# Patient Record
(demographics unavailable — no encounter records)

---

## 2024-11-05 NOTE — REASON FOR VISIT
[Home] : at home, [unfilled] , at the time of the visit. [Medical Office: ([facility])___] : at the medical office located in  [Patient] : the patient [This encounter was initiated by telehealth (audio with video) and converted to telephone (audio only) due to technical difficulties.] : This encounter was initiated by telehealth (audio with video) and converted to telephone (audio only) due to technical difficulties. [Follow-Up Visit] : a follow-up

## 2024-11-05 NOTE — HISTORY OF PRESENT ILLNESS
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  Date of Initial Diagnosis: 8/18/2021  [facility] 5/2024: TMB 5mts/mb. BARD1 V767f *4 (46%). HRAS Q61R (29.1%). PTCH1 splice variant. ASXL1 Q695*. ACVR1B R420*. CASP8 R452*, TERT promoter mutation. TGFBR2 S441F. VUS in BRCA2, BAP1, MYCN, ROS1, CIC, TGFBR2, KMT2D.  Current Treatment: Capecitabine  Previous Treatments: Vismodegib 150 mg daily 10/2021 - 2/2023 Vismodegib 150 mg daily, 2 weeks on 2 weeks off: 2/2023 - 5/2023 Cemiplimab 350 mg IV q3 weeks x 6 cycles: 5/16/23 - 8/29/23 Carboplatin AUC 4/paclitaxel 135 mg/m2 x 3 cycles: 9/19/23 - 10/31/23 Cetuximab 500 mg/m2 q2 weeks + nivolumab 240mg started at C2:11/28/23 - 06/2024 Vismodegib + nivolumab 06/2024-7/2024 Ipilimumab + nivolumab 07/11/2024 - present; C1 07/11/24 C2 08/01/24 Nivolumab 480 mg IV and vismodegib 150 mg daily: 06/2024 -    INTERVAL HISTORY:  Mr. Cook is a 67-year-old male with BCC and SCC of the face who presents for continued management of his locally advanced disease.  He is followed primarily at [facility] and sees Dr. Cata Bee, Dr. Abhinav Baldwin and Dr. Deni Garcia.  Patient reported that around 30 years ago he had BCC of the face resected by wide excision on scalp/forehead. In 2008, he noticed that the lesion had been growing but was experiencing financial difficulties caused by the recession and was unable to seek medical attention. In 2017, it begun to affect his vision and he attempted to use topical treatments. Eventually the erosive face lesion was biopsied 8/18/21 demonstrating BCC, superficial, nodular, and infiltrative types, + ulceration, extending to peripheral and deep margins. Pathologic review [facility] also noted focal metatypical features and neighboring proliferating actinic keratosis.  10/29/2021: He started vismodegib 150 mg daily and tolerated relatively well, with change in taste and intermittent muscle cramps ultimately requiring dose reduction. He achieved a marked clinical response to therapy prior to progression noted clinically as enlarging lesion on R cheek in May 2023.  5/16/2023: He began cemiplimab 350mg q3 weeks. When he presented for C2, he had significant pus drainage from opening above his R eye and throughout the wound. He was prescribed antibiotics by Dr. Baldwin, initially improved but required a longer course and ultimately prompted CT maxillofacial 8/11/23. The CT showed progression in his disease with a new large right frontal soft tissue mass, with erosive osseous changes and invasion of the R frontal sinus. Extensive additional regions of pre-existing ulcerate neoplasm are stable to minimally improved. He received a total of 6 cycles cemiplimab.  8/29/23: Dr. Baldwin biopsied multiple growing lesions including nasal septum, right nose, and right cheek. Pathology of all three regions showed SCC, well-differentiated, by cytokeratin staining.  09/15/23: He underwent baseline PET CT which demonstrated three separate hypermetabolic lesions involving the skin of the face including left frontal, midline nasal and right frontotemporal areas representing active malignancy, and bilateral hypermetabolic cervical lymph nodes. There was no evidence for distant hypermetabolic disease.  9/19/2023: He initiated dose reduced carbo/taxol and completed 2 cycles, tolerated well.   11/14/2023: Restaging noncontrast CT showed mixed response with progression in anterior nasal cavity and ethmoid sinuses with bone loss, multifocal lobulated thickening along the nose is worse compared to prior. Area of right superolateral orbital thickening extending towards the dura and along the right superior eyelid is also worse. Bilateral proptosis is stable. Chronic rhinosinusitis. Post-treatment changes in midline face and right orbit. There is no cervical lymphadenopathy or evidence of distant metastases. Left front region is not imaged on this scan.   11/28/23: He began cetuximab 500 mg/m2 and During C1 infusion, he had reaction with flushing / full body itching within about 5 min of infusion start. He was given famotidine 20 mg and methylpred 125 mg (in addition to the diphenhydramine 50 mg pre med) and was subsequently able to tolerate infusion at slower rate without issue.  1/11/24:  MR Face & Neck with and without contrast demonstrated "favorable treatment response with improved but persistent disease involving the right orbit, right aspect of the face with some minimal intracranial extension but without intra-axial extension. Small amount of nodular thickening is also noted in the left supraorbital scalp. A very small loculated fluid collection within right supraorbital portion of the tumor, most likely area of necrosis; no acute infarction, acute intracranial hemorrhage, or mass effect within the brain; stable 5 mm enhancing extra-axial lesion within the left aspect of the foramen magnum, likely incidental meningioma."  5/30/2024: Patient presented at [place] for cetuximab + nivo. He reported that the open area on the right side of his forehead was experiencing intermittent bloody discharge. Primary SE from treatment were fatigue, joint aches (hands/knees), acneiform rash on his neck/cheeeks (persistent but mild) - using topical clindamycin.   Due to progressive disease, his therapy was changed to vismodegib together with nivolumab which he has been tolerating well.  Patient is here today for a follow up visit, last seen mid - June.  Patient is s/p imaging at [place].   PATHOLOGY - Right Cheek Biopsy (4/17/2024): Squamous cell carcinoma, keratinizing - type, well - differentiated. Carcinoma is on the deep and radial margins.   IMAGING - MR Face (4/18/2024): Lytic destructive changes in the frontal bone and sinuses; interval increase in size of enhancing soft tissue mass that measures approximately 1.2 x 1.9 x 2.9 cm in the frontal region as well as in the right greater than left nasal labial fold likely to represent granulation tissue and/or disease progression. Small defect along the left nasal ala and anterior nasal septum. Polpoid mucosal thickening left maxillary and bilateral ethmoid air cells. Reactive pachymeningeal thickening in the left frontal region subjacent to the lytic bone lesions probably reactive. No epidural fluid collection. Thickening and enhancement along the posterior wall of the right globe.  - MR Brain (4/18/2024): No acute infarct, intracranial hemorrhage or abnormal parenchymal enhancement. Stable 5 mm enhancing round lesions in the left aspect of the foramen magnum behind the V4 segment of the vertebral artery.  - PET-CT (716/024): Interval progression of local extension of multiple now confluent hypermetabolic facial masses, consistent with primary neoplasm with associated erosion of the bilateral frontal bones medially. New evidence of FDG-avid pachymeningeal and possible leptomeningeal involvement. Local invasion of the right medial orbital wall with possible involvement of the ethmoid sinus and orbital septum and preseptal space penetration suspected. Interval progression of FDG-avid cervical lymphadenopathy. Slight interval increase in FDG uptake of bilateral axillary LNs.   Since he was last seen here, he initiated therapy on capecitabine due to progression of disease.  After 1 week of therapy, he states he noted an improvement in the appearance of his disease.  Given a phase I clinical trial opportunity, he discontinue the capecitabine while he considered trial participation; however, due to the trial logistics and travel complexity, he has opted not to pursue this study.    Clinically, he noted some toxicity due to capecitabine and has had difficulties with hyperglycemica in the setting of prednisone.

## 2024-11-05 NOTE — ASSESSMENT
[FreeTextEntry1] : Mr. Cook is a 67 year old male who is here today for a second opinion.  He has a history of BCC reseceted remotely, followed by recurrence in August 2021 of BCC with metatypical features involving the forehead, nose and cheek. He initially achieved good control on vismodegib until May 2023 when he developed disease progression with right eye vision loss and progressing disease to the contralateral face. He then was initiated on cemiplimab x 6 cycles and was found to have a new 4 cm frontal soft tissue mass which was invading his frontal sinus bone and orbit. Biopsies of these lesions were consistent with SCC. Patient then was transitioned to carbo/taxol and then was transitioned to cetuximab + nivo with an initial good clinical response.  In the setting of progression, his therapy was changed to vismodegib and nivoluamb and, most recently, capecitabine.  I will speak with Jaya Bee and her team regarding options which include reinitiation of capecitabine, perhaps at a dose reduction.  I will tentatively plan to touch base with the patient in 2 weeks time.

## 2024-11-12 NOTE — REVIEW OF SYSTEMS
[Diarrhea: Grade 0] : Diarrhea: Grade 0 [Negative] : Allergic/Immunologic [FreeTextEntry9] : Bilateral lower extremity swelling

## 2024-11-12 NOTE — HISTORY OF PRESENT ILLNESS
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  MOLECULAR CHARACTERISTICS: [facility] 5/2024: TMB 5mts/mb. BARD1 V767f *4 (46%). HRAS Q61R (29.1%). PTCH1 splice variant. ASXL1 Q695*. ACVR1B R420*. CASP8 R452*, TERT promoter mutation. TGFBR2 S441F. VUS in BRCA2, BAP1, MYCN, ROS1, CIC, TGFBR2, KMT2D.  CURRENT THERAPY: Capecitabine  PRIOR THERAPIES: Vismodegib 150 mg daily 10/2021 - 2/2023 Vismodegib 150 mg daily, 2 weeks on 2 weeks off: 2/2023 - 5/2023 Cemiplimab 350 mg IV q3 weeks x 6 cycles: 5/16/23 - 8/29/23 Carboplatin AUC 4/paclitaxel 135 mg/m2 x 3 cycles: 9/19/23 - 10/31/23 Cetuximab 500 mg/m2 q2 weeks + nivolumab 240mg started at C2:11/28/23 - 06/2024 Vismodegib + nivolumab 06/2024-7/2024 Ipilimumab + nivolumab 07/11/2024 - present; C1 07/11/24 C2 08/01/24 Nivolumab 480 mg IV and vismodegib 150 mg daily: 06/2024 -   INTERVAL HISTORY: Mr. Cook is a 67-year-old male with BCC and SCC of the face who presents for continued management of his locally advanced disease.  He is followed primarily at [facility] and sees Dr. Cata Bee, Dr. Abhinav Baldwin and Dr. Deni Garcia.  Please see my prior notes for his complex history.  Briefly, the patient reported that around 30 years ago he had BCC of the face resected by wide excision on scalp/forehead. In 2008, he noticed that the lesion had been growing but was experiencing financial difficulties caused by the recession and was unable to seek medical attention. In 2017, it begun to affect his vision and he attempted to use topical treatments. Eventually the erosive face lesion was biopsied 8/18/21 demonstrating BCC, superficial, nodular, and infiltrative types, + ulceration, extending to peripheral and deep margins. Pathologic review at [facility] also noted focal metatypical features and neighboring proliferating actinic keratosis.  He initiated therapy with vismodegib 150 mg daily on 10/29/2021and tolerated relatively well, with change in taste and intermittent muscle cramps ultimately requiring dose reduction. He achieved a marked clinical response to therapy prior to progression noted clinically as enlarging lesion on R cheek in May 2023.  5/16/2023: He began cemiplimab 350mg q3 weeks. When he presented for C2, he had significant pus drainage from opening above his R eye and throughout the wound. He was prescribed antibiotics by Dr. Baldwin, initially improved but required a longer course and ultimately prompted CT maxillofacial 8/11/23. The CT showed progression in his disease with a new large right frontal soft tissue mass, with erosive osseous changes and invasion of the R frontal sinus. Extensive additional regions of pre-existing ulcerate neoplasm are stable to minimally improved. He received a total of 6 cycles cemiplimab.  8/29/23: Dr. Baldwin biopsied multiple growing lesions including nasal septum, right nose, and right cheek. Pathology of all three regions showed SCC, well-differentiated, by cytokeratin staining.  09/15/23: He underwent baseline PET CT which demonstrated three separate hypermetabolic lesions involving the skin of the face including left frontal, midline nasal and right frontotemporal areas representing active malignancy, and bilateral hypermetabolic cervical lymph nodes. There was no evidence for distant hypermetabolic disease.  9/19/2023: He initiated dose reduced carbo/taxol and completed 2 cycles, tolerated well.   11/14/2023: Restaging noncontrast CT showed mixed response with progression in anterior nasal cavity and ethmoid sinuses with bone loss, multifocal lobulated thickening along the nose is worse compared to prior. Area of right superolateral orbital thickening extending towards the dura and along the right superior eyelid is also worse. Bilateral proptosis is stable. Chronic rhinosinusitis. Post-treatment changes in midline face and right orbit. There is no cervical lymphadenopathy or evidence of distant metastases. Left front region is not imaged on this scan.   11/28/23: He began cetuximab 500 mg/m2 and During C1 infusion, he had reaction with flushing / full body itching within about 5 min of infusion start. He was given famotidine 20 mg and methylpred 125 mg (in addition to the diphenhydramine 50 mg pre med) and was subsequently able to tolerate infusion at slower rate without issue.  1/11/24:  MR Face & Neck with and without contrast demonstrated "favorable treatment response with improved but persistent disease involving the right orbit, right aspect of the face with some minimal intracranial extension but without intra-axial extension. Small amount of nodular thickening is also noted in the left supraorbital scalp. A very small loculated fluid collection within right supraorbital portion of the tumor, most likely area of necrosis; no acute infarction, acute intracranial hemorrhage, or mass effect within the brain; stable 5 mm enhancing extra-axial lesion within the left aspect of the foramen magnum, likely incidental meningioma."  5/30/2024: Patient presented at [place] for cetuximab + nivo. He reported that the open area on the right side of his forehead was experiencing intermittent bloody discharge. Primary SE from treatment were fatigue, joint aches (hands/knees), acneiform rash on his neck/cheeeks (persistent but mild) - using topical clindamycin.   Due to progressive disease, his therapy was changed to vismodegib together with nivolumab which he has been tolerating well.  Patient is here today for a follow up visit, last seen mid - June.  Patient is s/p imaging at [place].   PATHOLOGY - Right Cheek Biopsy (4/17/2024): Squamous cell carcinoma, keratinizing - type, well - differentiated. Carcinoma is on the deep and radial margins.   IMAGING - MR Face (4/18/2024): Lytic destructive changes in the frontal bone and sinuses; interval increase in size of enhancing soft tissue mass that measures approximately 1.2 x 1.9 x 2.9 cm in the frontal region as well as in the right greater than left nasal labial fold likely to represent granulation tissue and/or disease progression. Small defect along the left nasal ala and anterior nasal septum. Polpoid mucosal thickening left maxillary and bilateral ethmoid air cells. Reactive pachymeningeal thickening in the left frontal region subjacent to the lytic bone lesions probably reactive. No epidural fluid collection. Thickening and enhancement along the posterior wall of the right globe.  - MR Brain (4/18/2024): No acute infarct, intracranial hemorrhage or abnormal parenchymal enhancement. Stable 5 mm enhancing round lesions in the left aspect of the foramen magnum behind the V4 segment of the vertebral artery.   - PET-CT (716/024): Interval progression of local extension of multiple now confluent hypermetabolic facial masses, consistent with primary neoplasm with associated erosion of the bilateral frontal bones medially. New evidence of FDG-avid pachymeningeal and possible leptomeningeal involvement. Local invasion of the right medial orbital wall with possible involvement of the ethmoid sinus and orbital septum and preseptal space penetration suspected. Interval progression of FDG-avid cervical lymphadenopathy. Slight interval increase in FDG uptake of bilateral axillary LNs.  Due to the development of colitis, the immunotherapy was discontinued.  He was started on a steroid taper. He then initiated therapy on capecitabine due to progression of disease.  After 1 week of therapy, he states he noted an improvement in the appearance of his disease.  Given a phase I clinical trial opportunity, he discontinue the capecitabine while he considered trial participation; however, due to the trial logistics and travel complexity, he has opted not to pursue this study.  He was started on metformin for the steroid induced hyperglycemia.  He was more recently restarted on capecitabine at a reduced dose, with good tolerance and a clinical response to therapy per the patient's report.

## 2024-11-12 NOTE — REASON FOR VISIT
[Home] : at home, [unfilled] , at the time of the visit. [Medical Office: ([facility])___] : at the medical office located in  [Patient] : the patient [Self] : self [This encounter was initiated by telehealth (audio with video) and converted to telephone (audio only) due to technical difficulties.] : This encounter was initiated by telehealth (audio with video) and converted to telephone (audio only) due to technical difficulties. [Follow-Up Visit] : a follow-up

## 2024-11-12 NOTE — ASSESSMENT
[FreeTextEntry1] : Mr. Cook is a 67 year old male who is here today for a discussion of further management options for his disease  He has a history of BCC reseceted remotely, followed by recurrence in August 2021 of BCC with metatypical features involving the forehead, nose and cheek. He initially achieved good control on vismodegib until May 2023 when he developed disease progression with right eye vision loss and progressing disease to the contralateral face. He then was initiated on cemiplimab x 6 cycles and was found to have a new 4 cm frontal soft tissue mass which was invading his frontal sinus bone and orbit. Biopsies of these lesions were consistent with SCC. Patient then was transitioned to carbo/taxol and then was transitioned to cetuximab + nivo with an initial good clinical response.  In the setting of progression, his therapy was changed to vismodegib and nivoluamb and, most recently, capecitabine.  Due to immune mediated colitis, he has been on a prednisone taper and is scheduled to receive infliximab later on this week.  Based upon history, the patient may be achieving a clinical response to capecitabine.  I did discuss potential investigational options, including our phase I trial of  (EGFR).  I reviewed the study rational, treatment logistics, and potential toxicities.  I discussed that eligibility requires evidence of disease progression as well as demonstration of EGFR expression on his tumor, with testing on going at [place].  After a long discussion, all of his questions and concerns were answered to his satisfaction. At this time, he will continue capecitabine under the care of Dr. Bee and her team; however, we will continue screening for the JANICE-1 trial as a potential option in the setting of disease progression.  He will remain under the primary care of the [place] team but will contact us should he need a next line of therapy. English

## 2024-12-09 NOTE — REASON FOR VISIT
[Follow-Up Visit] : a follow-up [Home] : at home, [unfilled] , at the time of the visit. [Medical Office: ([facility])___] : at the medical office located in  [Patient] : the patient [Self] : self [This encounter was initiated by telehealth (audio with video) and converted to telephone (audio only) due to technical difficulties.] : This encounter was initiated by telehealth (audio with video) and converted to telephone (audio only) due to technical difficulties.

## 2024-12-09 NOTE — REASON FOR VISIT
[Follow-Up Visit] : a follow-up [Home] : at home, [unfilled] , at the time of the visit. [Medical Office: ([facility])___] : at the medical office located in  [Patient] : the patient [Self] : self [This encounter was initiated by telehealth (audio with video) and converted to telephone (audio only) due to technical difficulties.] : This encounter was initiated by telehealth (audio with video) and converted to telephone (audio only) due to technical difficulties.

## 2024-12-10 NOTE — HISTORY OF PRESENT ILLNESS
[de-identified] : Patient is here today for a follow up visit, he was last seen on 11/12/2024.  Since his last visit, he was admitted in [facility] for bleeding in his small intestine, but was ultimately transferred to [place].  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  MOLECULAR CHARACTERISTICS: Foundation one 5/2024: TMB 5mts/mb. BARD1 V767f *4 (46%). HRAS Q61R (29.1%). PTCH1 splice variant. ASXL1 Q695*. ACVR1B R420*. CASP8 R452*, TERT promoter mutation. TGFBR2 S441F. VUS in BRCA2, BAP1, MYCN, ROS1, CIC, TGFBR2, KMT2D.  CURRENT THERAPY: Capecitabine (currently being held)  PRIOR THERAPIES: Vismodegib 150 mg daily 10/2021 - 2/2023 Vismodegib 150 mg daily, 2 weeks on 2 weeks off: 2/2023 - 5/2023 Cemiplimab 350 mg IV q3 weeks x 6 cycles: 5/16/23 - 8/29/23 Carboplatin AUC 4/paclitaxel 135 mg/m2 x 3 cycles: 9/19/23 - 10/31/23 Cetuximab 500 mg/m2 q2 weeks + nivolumab 240mg started at C2:11/28/23 - 06/2024 Vismodegib + nivolumab 06/2024-7/2024 Ipilimumab + nivolumab 07/11/2024 - present; C1 07/11/24 C2 08/01/24 Nivolumab 480 mg IV and vismodegib 150 mg daily: 06/2024 -   INTERVAL HISTORY: Mr. Cook is a 67-year-old male with BCC and SCC of the face who presents for continued management of his locally advanced disease.  He is followed primarily at [facility] and sees Dr. Cata Bee, Dr. Abhinav Baldwin and Dr. Deni Garcia.  Please see my prior notes for his complex history.  Briefly, the patient reported that around 30 years ago he had BCC of the face resected by wide excision on scalp/forehead. In 2008, he noticed that the lesion had been growing but was experiencing financial difficulties caused by the recession and was unable to seek medical attention. In 2017, it begun to affect his vision and he attempted to use topical treatments. Eventually the erosive face lesion was biopsied 8/18/21 demonstrating BCC, superficial, nodular, and infiltrative types, + ulceration, extending to peripheral and deep margins. Pathologic review at [facility] also noted focal metatypical features and neighboring proliferating actinic keratosis.  He initiated therapy with vismodegib 150 mg daily on 10/29/2021and tolerated relatively well, with change in taste and intermittent muscle cramps ultimately requiring dose reduction. He achieved a marked clinical response to therapy prior to progression noted clinically as enlarging lesion on R cheek in May 2023.  5/16/2023: He began cemiplimab 350mg q3 weeks. When he presented for C2, he had significant pus drainage from opening above his R eye and throughout the wound. He was prescribed antibiotics by Dr. Baldwin, initially improved but required a longer course and ultimately prompted CT maxillofacial 8/11/23. The CT showed progression in his disease with a new large right frontal soft tissue mass, with erosive osseous changes and invasion of the R frontal sinus. Extensive additional regions of pre-existing ulcerate neoplasm are stable to minimally improved. He received a total of 6 cycles cemiplimab.  8/29/23: Dr. Baldwin biopsied multiple growing lesions including nasal septum, right nose, and right cheek. Pathology of all three regions showed SCC, well-differentiated, by cytokeratin staining.  09/15/23: He underwent baseline PET CT which demonstrated three separate hypermetabolic lesions involving the skin of the face including left frontal, midline nasal and right frontotemporal areas representing active malignancy, and bilateral hypermetabolic cervical lymph nodes. There was no evidence for distant hypermetabolic disease.  9/19/2023: He initiated dose reduced carbo/taxol and completed 2 cycles, tolerated well.   11/14/2023: Restaging noncontrast CT showed mixed response with progression in anterior nasal cavity and ethmoid sinuses with bone loss, multifocal lobulated thickening along the nose is worse compared to prior. Area of right superolateral orbital thickening extending towards the dura and along the right superior eyelid is also worse. Bilateral proptosis is stable. Chronic rhinosinusitis. Post-treatment changes in midline face and right orbit. There is no cervical lymphadenopathy or evidence of distant metastases. Left front region is not imaged on this scan.   11/28/23: He began cetuximab 500 mg/m2 and During C1 infusion, he had reaction with flushing / full body itching within about 5 min of infusion start. He was given famotidine 20 mg and methylpred 125 mg (in addition to the diphenhydramine 50 mg pre med) and was subsequently able to tolerate infusion at slower rate without issue.  1/11/24:  MR Face & Neck with and without contrast demonstrated "favorable treatment response with improved but persistent disease involving the right orbit, right aspect of the face with some minimal intracranial extension but without intra-axial extension. Small amount of nodular thickening is also noted in the left supraorbital scalp. A very small loculated fluid collection within right supraorbital portion of the tumor, most likely area of necrosis; no acute infarction, acute intracranial hemorrhage, or mass effect within the brain; stable 5 mm enhancing extra-axial lesion within the left aspect of the foramen magnum, likely incidental meningioma."  5/30/2024: Patient presented at [facility] for cetuximab + nivo. He reported that the open area on the right side of his forehead was experiencing intermittent bloody discharge. Primary SE from treatment were fatigue, joint aches (hands/knees), acneiform rash on his neck/cheeeks (persistent but mild) - using topical clindamycin.   Due to progressive disease, his therapy was changed to vismodegib together with nivolumab which he has been tolerating well.  Patient is here today for a follow up visit, last seen mid - June.  Patient is s/p imaging at [facility].   PATHOLOGY - Right Cheek Biopsy (4/17/2024): Squamous cell carcinoma, keratinizing - type, well - differentiated. Carcinoma is on the deep and radial margins.   IMAGING - MR Face (4/18/2024): Lytic destructive changes in the frontal bone and sinuses; interval increase in size of enhancing soft tissue mass that measures approximately 1.2 x 1.9 x 2.9 cm in the frontal region as well as in the right greater than left nasal labial fold likely to represent granulation tissue and/or disease progression. Small defect along the left nasal ala and anterior nasal septum. Polpoid mucosal thickening left maxillary and bilateral ethmoid air cells. Reactive pachymeningeal thickening in the left frontal region subjacent to the lytic bone lesions probably reactive. No epidural fluid collection. Thickening and enhancement along the posterior wall of the right globe.  - MR Brain (4/18/2024): No acute infarct, intracranial hemorrhage or abnormal parenchymal enhancement. Stable 5 mm enhancing round lesions in the left aspect of the foramen magnum behind the V4 segment of the vertebral artery.   - PET-CT (716/024): Interval progression of local extension of multiple now confluent hypermetabolic facial masses, consistent with primary neoplasm with associated erosion of the bilateral frontal bones medially. New evidence of FDG-avid pachymeningeal and possible leptomeningeal involvement. Local invasion of the right medial orbital wall with possible involvement of the ethmoid sinus and orbital septum and preseptal space penetration suspected. Interval progression of FDG-avid cervical lymphadenopathy. Slight interval increase in FDG uptake of bilateral axillary LNs.  Due to the development of colitis, the immunotherapy was discontinued.  He was started on a steroid taper. He then initiated therapy on capecitabine due to progression of disease.  After 1 week of therapy, he states he noted an improvement in the appearance of his disease.  Given a phase I clinical trial opportunity, he discontinued the capecitabine while he considered trial participation; however, due to the trial logistics and travel complexity, he has opted not to pursue this study.  He was started on metformin for the steroid induced hyperglycemia.  He was more recently restarted on capecitabine at a reduced dose, with good tolerance and a clinical response to therapy per the patient's report.  Since his last visit, he was admitted in [facility] for bleeding in his small intestine but was ultimately transferred to [facility]. He states that his Hgb remains around 7 g/dL and they will be doing an additional study to determine where the bleed is occurring. Patient reports that he would like to be reconsidered for the trial once his bleeding has been controlled.

## 2024-12-10 NOTE — REVIEW OF SYSTEMS
[Diarrhea: Grade 0] : Diarrhea: Grade 0 [Negative] : Allergic/Immunologic [FreeTextEntry9] : Bilateral lower extremity swelling [FreeTextEntry7] : Currently experiencing a GI bleed.

## 2024-12-10 NOTE — ASSESSMENT
[FreeTextEntry1] : Mr. Cook is a 67-year-old male who is here today for a discussion of further management options for his disease  He has a history of BCC resected remotely, followed by recurrence in August 2021 of BCC with metatypical features involving the forehead, nose and cheek. He initially achieved good control on vismodegib until May 2023 when he developed disease progression with right eye vision loss and progressing disease to the contralateral face. He then was initiated on cemiplimab x 6 cycles and was found to have a new 4 cm frontal soft tissue mass which was invading his frontal sinus bone and orbit. Biopsies of these lesions were consistent with SCC. Patient then was transitioned to carbo/taxol and then was transitioned to cetuximab + nivo with an initial good clinical response.  In the setting of progression, his therapy was changed to vismodegib and nivoluamb and, most recently, capecitabine.  Due to immune mediated colitis, he had been on a prednisone taper and is scheduled to received infliximab.  Based upon history, the patient may have been achieving a clinical response to capecitabine.  During our previous visits, I did discuss potential investigational options, including our phase I trial of  (EGFR).  I reviewed the study rational, treatment logistics, and potential toxicities.  I discussed that eligibility requires evidence of disease progression as well as demonstration of EGFR expression on his tumor. EGFR staining revealed +1 expression of ERBB1/EFGR.   At this time, we are recommending that patient control his bleeding. Once he is stable, we will continue screening for the JANICE-1 trial as a potential option in the setting of disease progression. In addition, patient also reports that he would like to transfer his care to [facility]. We will see patient in person next Tuesday for an in person visit.   Patient's son, Luciano, was present during this call, he provided his # as well, it is [phone].

## 2024-12-10 NOTE — ASSESSMENT
[FreeTextEntry1] : Mr. Cook is a 67-year-old male who is here today for a discussion of further management options for his disease  He has a history of BCC resected remotely, followed by recurrence in August 2021 of BCC with metatypical features involving the forehead, nose and cheek. He initially achieved good control on vismodegib until May 2023 when he developed disease progression with right eye vision loss and progressing disease to the contralateral face. He then was initiated on cemiplimab x 6 cycles and was found to have a new 4 cm frontal soft tissue mass which was invading his frontal sinus bone and orbit. Biopsies of these lesions were consistent with SCC. Patient then was transitioned to carbo/taxol and then was transitioned to cetuximab + nivo with an initial good clinical response.  In the setting of progression, his therapy was changed to vismodegib and nivoluamb and, most recently, capecitabine.  Due to immune mediated colitis, he had been on a prednisone taper and is scheduled to received infliximab.  Based upon history, the patient may have been achieving a clinical response to capecitabine.  During our previous visits, I did discuss potential investigational options, including our phase I trial of  (EGFR).  I reviewed the study rational, treatment logistics, and potential toxicities.  I discussed that eligibility requires evidence of disease progression as well as demonstration of EGFR expression on his tumor. EGFR staining revealed +1 expression of ERBB1/EFGR.   At this time, we are recommending that patient control his bleeding. Once he is stable, we will continue screening for the JANICE-1 trial as a potential option in the setting of disease progression. In addition, patient also reports that he would like to transfer his care to [facility]. We will see patient in person next Tuesday for an in person visit.   Patient's son, Luciano, was present during this call, he provided his # as well, it is [phone].

## 2024-12-10 NOTE — HISTORY OF PRESENT ILLNESS
[de-identified] : Patient is here today for a follow up visit, he was last seen on 11/12/2024.  Since his last visit, he was admitted in [facility] for bleeding in his small intestine, but was ultimately transferred to [place].  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  MOLECULAR CHARACTERISTICS: Foundation one 5/2024: TMB 5mts/mb. BARD1 V767f *4 (46%). HRAS Q61R (29.1%). PTCH1 splice variant. ASXL1 Q695*. ACVR1B R420*. CASP8 R452*, TERT promoter mutation. TGFBR2 S441F. VUS in BRCA2, BAP1, MYCN, ROS1, CIC, TGFBR2, KMT2D.  CURRENT THERAPY: Capecitabine (currently being held)  PRIOR THERAPIES: Vismodegib 150 mg daily 10/2021 - 2/2023 Vismodegib 150 mg daily, 2 weeks on 2 weeks off: 2/2023 - 5/2023 Cemiplimab 350 mg IV q3 weeks x 6 cycles: 5/16/23 - 8/29/23 Carboplatin AUC 4/paclitaxel 135 mg/m2 x 3 cycles: 9/19/23 - 10/31/23 Cetuximab 500 mg/m2 q2 weeks + nivolumab 240mg started at C2:11/28/23 - 06/2024 Vismodegib + nivolumab 06/2024-7/2024 Ipilimumab + nivolumab 07/11/2024 - present; C1 07/11/24 C2 08/01/24 Nivolumab 480 mg IV and vismodegib 150 mg daily: 06/2024 -   INTERVAL HISTORY: Mr. Cook is a 67-year-old male with BCC and SCC of the face who presents for continued management of his locally advanced disease.  He is followed primarily at [facility] and sees Dr. Cata Bee, Dr. Abhinav Baldwin and Dr. Deni Garcia.  Please see my prior notes for his complex history.  Briefly, the patient reported that around 30 years ago he had BCC of the face resected by wide excision on scalp/forehead. In 2008, he noticed that the lesion had been growing but was experiencing financial difficulties caused by the recession and was unable to seek medical attention. In 2017, it begun to affect his vision and he attempted to use topical treatments. Eventually the erosive face lesion was biopsied 8/18/21 demonstrating BCC, superficial, nodular, and infiltrative types, + ulceration, extending to peripheral and deep margins. Pathologic review at [facility] also noted focal metatypical features and neighboring proliferating actinic keratosis.  He initiated therapy with vismodegib 150 mg daily on 10/29/2021and tolerated relatively well, with change in taste and intermittent muscle cramps ultimately requiring dose reduction. He achieved a marked clinical response to therapy prior to progression noted clinically as enlarging lesion on R cheek in May 2023.  5/16/2023: He began cemiplimab 350mg q3 weeks. When he presented for C2, he had significant pus drainage from opening above his R eye and throughout the wound. He was prescribed antibiotics by Dr. Baldwin, initially improved but required a longer course and ultimately prompted CT maxillofacial 8/11/23. The CT showed progression in his disease with a new large right frontal soft tissue mass, with erosive osseous changes and invasion of the R frontal sinus. Extensive additional regions of pre-existing ulcerate neoplasm are stable to minimally improved. He received a total of 6 cycles cemiplimab.  8/29/23: Dr. Baldwin biopsied multiple growing lesions including nasal septum, right nose, and right cheek. Pathology of all three regions showed SCC, well-differentiated, by cytokeratin staining.  09/15/23: He underwent baseline PET CT which demonstrated three separate hypermetabolic lesions involving the skin of the face including left frontal, midline nasal and right frontotemporal areas representing active malignancy, and bilateral hypermetabolic cervical lymph nodes. There was no evidence for distant hypermetabolic disease.  9/19/2023: He initiated dose reduced carbo/taxol and completed 2 cycles, tolerated well.   11/14/2023: Restaging noncontrast CT showed mixed response with progression in anterior nasal cavity and ethmoid sinuses with bone loss, multifocal lobulated thickening along the nose is worse compared to prior. Area of right superolateral orbital thickening extending towards the dura and along the right superior eyelid is also worse. Bilateral proptosis is stable. Chronic rhinosinusitis. Post-treatment changes in midline face and right orbit. There is no cervical lymphadenopathy or evidence of distant metastases. Left front region is not imaged on this scan.   11/28/23: He began cetuximab 500 mg/m2 and During C1 infusion, he had reaction with flushing / full body itching within about 5 min of infusion start. He was given famotidine 20 mg and methylpred 125 mg (in addition to the diphenhydramine 50 mg pre med) and was subsequently able to tolerate infusion at slower rate without issue.  1/11/24:  MR Face & Neck with and without contrast demonstrated "favorable treatment response with improved but persistent disease involving the right orbit, right aspect of the face with some minimal intracranial extension but without intra-axial extension. Small amount of nodular thickening is also noted in the left supraorbital scalp. A very small loculated fluid collection within right supraorbital portion of the tumor, most likely area of necrosis; no acute infarction, acute intracranial hemorrhage, or mass effect within the brain; stable 5 mm enhancing extra-axial lesion within the left aspect of the foramen magnum, likely incidental meningioma."  5/30/2024: Patient presented at [facility] for cetuximab + nivo. He reported that the open area on the right side of his forehead was experiencing intermittent bloody discharge. Primary SE from treatment were fatigue, joint aches (hands/knees), acneiform rash on his neck/cheeeks (persistent but mild) - using topical clindamycin.   Due to progressive disease, his therapy was changed to vismodegib together with nivolumab which he has been tolerating well.  Patient is here today for a follow up visit, last seen mid - June.  Patient is s/p imaging at [facility].   PATHOLOGY - Right Cheek Biopsy (4/17/2024): Squamous cell carcinoma, keratinizing - type, well - differentiated. Carcinoma is on the deep and radial margins.   IMAGING - MR Face (4/18/2024): Lytic destructive changes in the frontal bone and sinuses; interval increase in size of enhancing soft tissue mass that measures approximately 1.2 x 1.9 x 2.9 cm in the frontal region as well as in the right greater than left nasal labial fold likely to represent granulation tissue and/or disease progression. Small defect along the left nasal ala and anterior nasal septum. Polpoid mucosal thickening left maxillary and bilateral ethmoid air cells. Reactive pachymeningeal thickening in the left frontal region subjacent to the lytic bone lesions probably reactive. No epidural fluid collection. Thickening and enhancement along the posterior wall of the right globe.  - MR Brain (4/18/2024): No acute infarct, intracranial hemorrhage or abnormal parenchymal enhancement. Stable 5 mm enhancing round lesions in the left aspect of the foramen magnum behind the V4 segment of the vertebral artery.   - PET-CT (716/024): Interval progression of local extension of multiple now confluent hypermetabolic facial masses, consistent with primary neoplasm with associated erosion of the bilateral frontal bones medially. New evidence of FDG-avid pachymeningeal and possible leptomeningeal involvement. Local invasion of the right medial orbital wall with possible involvement of the ethmoid sinus and orbital septum and preseptal space penetration suspected. Interval progression of FDG-avid cervical lymphadenopathy. Slight interval increase in FDG uptake of bilateral axillary LNs.  Due to the development of colitis, the immunotherapy was discontinued.  He was started on a steroid taper. He then initiated therapy on capecitabine due to progression of disease.  After 1 week of therapy, he states he noted an improvement in the appearance of his disease.  Given a phase I clinical trial opportunity, he discontinued the capecitabine while he considered trial participation; however, due to the trial logistics and travel complexity, he has opted not to pursue this study.  He was started on metformin for the steroid induced hyperglycemia.  He was more recently restarted on capecitabine at a reduced dose, with good tolerance and a clinical response to therapy per the patient's report.  Since his last visit, he was admitted in [facility] for bleeding in his small intestine but was ultimately transferred to [facility]. He states that his Hgb remains around 7 g/dL and they will be doing an additional study to determine where the bleed is occurring. Patient reports that he would like to be reconsidered for the trial once his bleeding has been controlled.

## 2024-12-18 NOTE — HISTORY OF PRESENT ILLNESS
[de-identified] : 12/17/2024 The [facility] team reported that his tumor is now impacting his left eye, patient is now scheduled to see Dr. Deni Garcia this week to address this issue.  I also reviewed a medical note from [facility] ([facility] from earlier this month. Regarding his facial lesion, patient has been initiated on doxycycline therapy. He was referred to wound care, where it was advised that he perform twice-daily saline rinses and use xeroform dressing, covering the wound with Telfa, with an optional application of clindamycin ointment to promote healing and prevent infection.  Furthermore, the patient experienced a significant gastrointestinal bleed, which led to a push enterostomy performed at [facility] on December 4, 2024. 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 During his hospital stay starting November 21, 2024, he required 12 units of packed red blood cells (pRBCs). His renal function showed notable changes, with creatinine levels rising from a baseline of 1.0 to 2.1 on the date of the visit.  12/10/2024 Since his last visit, he was admitted in [facility] for bleeding in his small intestine but was ultimately transferred to [facility]. He states that his Hgb remains around 7 g/dL and they will be doing an additional study to determine where the bleed is occurring. Patient reports that he would like to be reconsidered for the trial once his bleeding has been controlled.  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  MOLECULAR CHARACTERISTICS: Foundation one 5/2024: TMB 5mts/mb. BARD1 V767f *4 (46%). HRAS Q61R (29.1%). PTCH1 splice variant. ASXL1 Q695*. ACVR1B R420*. CASP8 R452*, TERT promoter mutation. TGFBR2 S441F. VUS in BRCA2, BAP1, MYCN, ROS1, CIC, TGFBR2, KMT2D.  CURRENT THERAPY: Capecitabine (currently being held)  PRIOR THERAPIES: Vismodegib 150 mg daily 10/2021 - 2/2023 Vismodegib 150 mg daily, 2 weeks on 2 weeks off: 2/2023 - 5/2023 Cemiplimab 350 mg IV q3 weeks x 6 cycles: 5/16/23 - 8/29/23 Carboplatin AUC 4/paclitaxel 135 mg/m2 x 3 cycles: 9/19/23 - 10/31/23 Cetuximab 500 mg/m2 q2 weeks + nivolumab 240mg started at C2:11/28/23 - 06/2024 Vismodegib + nivolumab 06/2024-7/2024 Ipilimumab + nivolumab 07/11/2024 - present; C1 07/11/24 C2 08/01/24 Nivolumab 480 mg IV and vismodegib 150 mg daily: 06/2024 -   INTERVAL HISTORY: Mr. Cook is a 67-year-old male with BCC and SCC of the face who presents for continued management of his locally advanced disease.  He is followed primarily at [facility] and sees Dr. Cata Bee, Dr. Abhinav Baldwin and Dr. Deni Garcia.  Please see my prior notes for his complex history.  Briefly, the patient reported that around 30 years ago he had BCC of the face resected by wide excision on scalp/forehead. In 2008, he noticed that the lesion had been growing but was experiencing financial difficulties caused by the recession and was unable to seek medical attention. In 2017, it begun to affect his vision and he attempted to use topical treatments. Eventually the erosive face lesion was biopsied 8/18/21 demonstrating BCC, superficial, nodular, and infiltrative types, + ulceration, extending to peripheral and deep margins. Pathologic review at [facility] also noted focal metatypical features and neighboring proliferating actinic keratosis.  He initiated therapy with vismodegib 150 mg daily on 10/29/2021and tolerated relatively well, with change in taste and intermittent muscle cramps ultimately requiring dose reduction. He achieved a marked clinical response to therapy prior to progression noted clinically as enlarging lesion on R cheek in May 2023.  5/16/2023: He began cemiplimab 350mg q3 weeks. When he presented for C2, he had significant pus drainage from opening above his R eye and throughout the wound. He was prescribed antibiotics by Dr. Baldwin, initially improved but required a longer course and ultimately prompted CT maxillofacial 8/11/23. The CT showed progression in his disease with a new large right frontal soft tissue mass, with erosive osseous changes and invasion of the R frontal sinus. Extensive additional regions of pre-existing ulcerate neoplasm are stable to minimally improved. He received a total of 6 cycles cemiplimab.  8/29/23: Dr. Baldwin biopsied multiple growing lesions including nasal septum, right nose, and right cheek. Pathology of all three regions showed SCC, well-differentiated, by cytokeratin staining.  09/15/23: He underwent baseline PET CT which demonstrated three separate hypermetabolic lesions involving the skin of the face including left frontal, midline nasal and right frontotemporal areas representing active malignancy, and bilateral hypermetabolic cervical lymph nodes. There was no evidence for distant hypermetabolic disease.  9/19/2023: He initiated dose reduced carbo/taxol and completed 2 cycles, tolerated well.   11/14/2023: Restaging noncontrast CT showed mixed response with progression in anterior nasal cavity and ethmoid sinuses with bone loss, multifocal lobulated thickening along the nose is worse compared to prior. Area of right superolateral orbital thickening extending towards the dura and along the right superior eyelid is also worse. Bilateral proptosis is stable. Chronic rhinosinusitis. Post-treatment changes in midline face and right orbit. There is no cervical lymphadenopathy or evidence of distant metastases. Left front region is not imaged on this scan.   11/28/23: He began cetuximab 500 mg/m2 and During C1 infusion, he had reaction with flushing / full body itching within about 5 min of infusion start. He was given famotidine 20 mg and methylpred 125 mg (in addition to the diphenhydramine 50 mg pre med) and was subsequently able to tolerate infusion at slower rate without issue.  1/11/24:  MR Face & Neck with and without contrast demonstrated "favorable treatment response with improved but persistent disease involving the right orbit, right aspect of the face with some minimal intracranial extension but without intra-axial extension. Small amount of nodular thickening is also noted in the left supraorbital scalp. A very small loculated fluid collection within right supraorbital portion of the tumor, most likely area of necrosis; no acute infarction, acute intracranial hemorrhage, or mass effect within the brain; stable 5 mm enhancing extra-axial lesion within the left aspect of the foramen magnum, likely incidental meningioma."  5/30/2024: Patient presented at [facility] for cetuximab + nivo. He reported that the open area on the right side of his forehead was experiencing intermittent bloody discharge. Primary SE from treatment were fatigue, joint aches (hands/knees), acneiform rash on his neck/cheeeks (persistent but mild) - using topical clindamycin.   Due to progressive disease, his therapy was changed to vismodegib together with nivolumab which he has been tolerating well.  Patient is here today for a follow up visit, last seen mid - June.  Patient is s/p imaging at [facility].   PATHOLOGY - Right Cheek Biopsy (4/17/2024): Squamous cell carcinoma, keratinizing - type, well - differentiated. Carcinoma is on the deep and radial margins.   IMAGING - MR Face (4/18/2024): Lytic destructive changes in the frontal bone and sinuses; interval increase in size of enhancing soft tissue mass that measures approximately 1.2 x 1.9 x 2.9 cm in the frontal region as well as in the right greater than left nasal labial fold likely to represent granulation tissue and/or disease progression. Small defect along the left nasal ala and anterior nasal septum. Polpoid mucosal thickening left maxillary and bilateral ethmoid air cells. Reactive pachymeningeal thickening in the left frontal region subjacent to the lytic bone lesions probably reactive. No epidural fluid collection. Thickening and enhancement along the posterior wall of the right globe.  - MR Brain (4/18/2024): No acute infarct, intracranial hemorrhage or abnormal parenchymal enhancement. Stable 5 mm enhancing round lesions in the left aspect of the foramen magnum behind the V4 segment of the vertebral artery.   - PET-CT (716/024): Interval progression of local extension of multiple now confluent hypermetabolic facial masses, consistent with primary neoplasm with associated erosion of the bilateral frontal bones medially. New evidence of FDG-avid pachymeningeal and possible leptomeningeal involvement. Local invasion of the right medial orbital wall with possible involvement of the ethmoid sinus and orbital septum and preseptal space penetration suspected. Interval progression of FDG-avid cervical lymphadenopathy. Slight interval increase in FDG uptake of bilateral axillary LNs.  Due to the development of colitis, the immunotherapy was discontinued.  He was started on a steroid taper. He then initiated therapy on capecitabine due to progression of disease.  After 1 week of therapy, he states he noted an improvement in the appearance of his disease.  Given a phase I clinical trial opportunity, he discontinued the capecitabine while he considered trial participation; however, due to the trial logistics and travel complexity, he has opted not to pursue this study.  He was started on metformin for the steroid induced hyperglycemia.  He was more recently restarted on capecitabine at a reduced dose, with good tolerance and a clinical response to therapy per the patient's report.  12/10/2024 Since his last visit, he was admitted in [facility] for bleeding in his small intestine but was ultimately transferred to [facility]. He states that his Hgb remains around 7 g/dL and they will be doing an additional study to determine where the bleed is occurring. Patient reports that he would like to be reconsidered for the trial once his bleeding has been controlled.   12/17/2024 The [facility] team reported that his tumor is now impacting his left eye, patient is now scheduled to see Dr. Deni Garcia this week to address this issue.  I also reviewed a medical note from [facility] from earlier this month. Regarding his facial lesion, patient has been initiated on doxycycline therapy. He was referred to wound care, where it was advised that he perform twice-daily saline rinses and use xeroform dressing, covering the wound with Telfa, with an optional application of clindamycin ointment to promote healing and prevent infection.  Furthermore, the patient experienced a significant gastrointestinal bleed, which led to a push enterostomy performed at [facility] on December 4, 2024. 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 During his hospital stay starting November 21, 2024, he required 12 units of packed red blood cells (pRBCs). His renal function showed notable changes, with creatinine levels rising from a baseline of 1.0 to 2.1 on the date of the visit.  Patient spoke to his [facility] team prior to this appointment. The plan is for patient to have a port inserted on Thursday and then start 5FU/Cisplatin next week, we also discussed using Imiquimod Cream to the lesion near his eye.

## 2024-12-18 NOTE — REASON FOR VISIT
[Follow-Up Visit] : a follow-up [Home] : at home, [unfilled] , at the time of the visit. [Medical Office: ([facility])___] : at the medical office located in  [Patient] : the patient [Self] : self [This encounter was initiated by telehealth (audio with video) and converted to telephone (audio only) due to technical difficulties.] : This encounter was initiated by telehealth (audio with video) and converted to telephone (audio only) due to technical difficulties. [Family Member] : family member

## 2024-12-18 NOTE — PHYSICAL EXAM
[Normal] : affect appropriate [Fully active, able to carry on all pre-disease performance without restriction] : Status 0 - Fully active, able to carry on all pre-disease performance without restriction [de-identified] : Wound on right side of his face, which encroached on his right eye and right side of his face.  [de-identified] : No increased work of breathing.

## 2024-12-18 NOTE — ASSESSMENT
[FreeTextEntry1] : Mr. Cook is a 67-year-old male who is here today for a discussion of further management options for his disease  He has a history of BCC resected remotely, followed by recurrence in August 2021 of BCC with metatypical features involving the forehead, nose and cheek. He initially achieved good control on vismodegib until May 2023 when he developed disease progression with right eye vision loss and progressing disease to the contralateral face. He then was initiated on cemiplimab x 6 cycles and was found to have a new 4 cm frontal soft tissue mass which was invading his frontal sinus bone and orbit. Biopsies of these lesions were consistent with SCC. Patient then was transitioned to carbo/taxol and then was transitioned to cetuximab + nivo with an initial good clinical response.  In the setting of progression, his therapy was changed to vismodegib and nivoluamb and, most recently, capecitabine. 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   While on capecitabine, patient thought that he was achieving a clinical response. However, it was held when patient experienced a GI bleed and required 12 units of PRBCs, endoclips and APC.   He will have a port placed on Thursday and will begin 5fu/cisplatin next week at [facility]. In addition, patient should use imiquimod cream to his wound and should also be set up with palliative care at [facility].   We will see him back in clinic within 2 months (~February 2025).

## 2024-12-18 NOTE — REVIEW OF SYSTEMS
[Diarrhea: Grade 0] : Diarrhea: Grade 0 [Negative] : Allergic/Immunologic [FreeTextEntry7] : Currently experiencing a GI bleed.

## 2024-12-18 NOTE — PHYSICAL EXAM
[Normal] : affect appropriate [Fully active, able to carry on all pre-disease performance without restriction] : Status 0 - Fully active, able to carry on all pre-disease performance without restriction [de-identified] : Wound on right side of his face, which encroached on his right eye and right side of his face.  [de-identified] : No increased work of breathing.

## 2024-12-18 NOTE — REASON FOR VISIT
[Follow-Up Visit] : a follow-up [Home] : at home, [unfilled] , at the time of the visit. [Medical Office: ([facility])___] : at the medical office located in  [Patient] : the patient [Self] : self [This encounter was initiated by telehealth (audio with video) and converted to telephone (audio only) due to technical difficulties.] : This encounter was initiated by telehealth (audio with video) and converted to telephone (audio only) due to technical difficulties. [Family Member] : family member

## 2024-12-18 NOTE — HISTORY OF PRESENT ILLNESS
[de-identified] : 12/17/2024 The [place] team reported that his tumor is now impacting his left eye, patient is now scheduled to see Dr. Deni Garcia this week to address this issue.  I also reviewed a medical note from [facility] ([facility]) from earlier this month. Regarding his facial lesion, patient has been initiated on doxycycline therapy. He was referred to wound care, where it was advised that he perform twice-daily saline rinses and use xeroform dressing, covering the wound with Telfa, with an optional application of clindamycin ointment to promote healing and prevent infection.  Furthermore, the patient experienced a significant gastrointestinal bleed, which led to a push enterostomy performed at [facility] on December 4, 2024. 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 During his hospital stay starting November 21, 2024, he required 12 units of packed red blood cells (pRBCs). His renal function showed notable changes, with creatinine levels rising from a baseline of 1.0 to 2.1 on the date of the visit.  12/10/2024 Since his last visit, he was admitted in [facility] for bleeding in his small intestine but was ultimately transferred to [place]. He states that his Hgb remains around 7 g/dL and they will be doing an additional study to determine where the bleed is occurring. Patient reports that he would like to be reconsidered for the trial once his bleeding has been controlled.  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  MOLECULAR CHARACTERISTICS: Foundation one 5/2024: TMB 5mts/mb. BARD1 V767f *4 (46%). HRAS Q61R (29.1%). PTCH1 splice variant. ASXL1 Q695*. ACVR1B R420*. CASP8 R452*, TERT promoter mutation. TGFBR2 S441F. VUS in BRCA2, BAP1, MYCN, ROS1, CIC, TGFBR2, KMT2D.  CURRENT THERAPY: Capecitabine (currently being held)  PRIOR THERAPIES: Vismodegib 150 mg daily 10/2021 - 2/2023 Vismodegib 150 mg daily, 2 weeks on 2 weeks off: 2/2023 - 5/2023 Cemiplimab 350 mg IV q3 weeks x 6 cycles: 5/16/23 - 8/29/23 Carboplatin AUC 4/paclitaxel 135 mg/m2 x 3 cycles: 9/19/23 - 10/31/23 Cetuximab 500 mg/m2 q2 weeks + nivolumab 240mg started at C2:11/28/23 - 06/2024 Vismodegib + nivolumab 06/2024-7/2024 Ipilimumab + nivolumab 07/11/2024 - present; C1 07/11/24 C2 08/01/24 Nivolumab 480 mg IV and vismodegib 150 mg daily: 06/2024 -   INTERVAL HISTORY: Mr. Cook is a 67-year-old male with BCC and SCC of the face who presents for continued management of his locally advanced disease.  He is followed primarily at [facility] and sees Dr. Cata Bee, Dr. Abhinav Baldwin and Dr. Deni Garcia.  Please see my prior notes for his complex history.  Briefly, the patient reported that around 30 years ago he had BCC of the face resected by wide excision on scalp/forehead. In 2008, he noticed that the lesion had been growing but was experiencing financial difficulties caused by the recession and was unable to seek medical attention. In 2017, it begun to affect his vision and he attempted to use topical treatments. Eventually the erosive face lesion was biopsied 8/18/21 demonstrating BCC, superficial, nodular, and infiltrative types, + ulceration, extending to peripheral and deep margins. Pathologic review at [facility] also noted focal metatypical features and neighboring proliferating actinic keratosis.  He initiated therapy with vismodegib 150 mg daily on 10/29/2021and tolerated relatively well, with change in taste and intermittent muscle cramps ultimately requiring dose reduction. He achieved a marked clinical response to therapy prior to progression noted clinically as enlarging lesion on R cheek in May 2023.  5/16/2023: He began cemiplimab 350mg q3 weeks. When he presented for C2, he had significant pus drainage from opening above his R eye and throughout the wound. He was prescribed antibiotics by Dr. Baldwin, initially improved but required a longer course and ultimately prompted CT maxillofacial 8/11/23. The CT showed progression in his disease with a new large right frontal soft tissue mass, with erosive osseous changes and invasion of the R frontal sinus. Extensive additional regions of pre-existing ulcerate neoplasm are stable to minimally improved. He received a total of 6 cycles cemiplimab.  8/29/23: Dr. Baldwin biopsied multiple growing lesions including nasal septum, right nose, and right cheek. Pathology of all three regions showed SCC, well-differentiated, by cytokeratin staining.  09/15/23: He underwent baseline PET CT which demonstrated three separate hypermetabolic lesions involving the skin of the face including left frontal, midline nasal and right frontotemporal areas representing active malignancy, and bilateral hypermetabolic cervical lymph nodes. There was no evidence for distant hypermetabolic disease.  9/19/2023: He initiated dose reduced carbo/taxol and completed 2 cycles, tolerated well.   11/14/2023: Restaging noncontrast CT showed mixed response with progression in anterior nasal cavity and ethmoid sinuses with bone loss, multifocal lobulated thickening along the nose is worse compared to prior. Area of right superolateral orbital thickening extending towards the dura and along the right superior eyelid is also worse. Bilateral proptosis is stable. Chronic rhinosinusitis. Post-treatment changes in midline face and right orbit. There is no cervical lymphadenopathy or evidence of distant metastases. Left front region is not imaged on this scan.   11/28/23: He began cetuximab 500 mg/m2 and During C1 infusion, he had reaction with flushing / full body itching within about 5 min of infusion start. He was given famotidine 20 mg and methylpred 125 mg (in addition to the diphenhydramine 50 mg pre med) and was subsequently able to tolerate infusion at slower rate without issue.  1/11/24:  MR Face & Neck with and without contrast demonstrated "favorable treatment response with improved but persistent disease involving the right orbit, right aspect of the face with some minimal intracranial extension but without intra-axial extension. Small amount of nodular thickening is also noted in the left supraorbital scalp. A very small loculated fluid collection within right supraorbital portion of the tumor, most likely area of necrosis; no acute infarction, acute intracranial hemorrhage, or mass effect within the brain; stable 5 mm enhancing extra-axial lesion within the left aspect of the foramen magnum, likely incidental meningioma."  5/30/2024: Patient presented at [place] for cetuximab + nivo. He reported that the open area on the right side of his forehead was experiencing intermittent bloody discharge. Primary SE from treatment were fatigue, joint aches (hands/knees), acneiform rash on his neck/cheeeks (persistent but mild) - using topical clindamycin.   Due to progressive disease, his therapy was changed to vismodegib together with nivolumab which he has been tolerating well.  Patient is here today for a follow up visit, last seen mid - June.  Patient is s/p imaging at [facility].   PATHOLOGY - Right Cheek Biopsy (4/17/2024): Squamous cell carcinoma, keratinizing - type, well - differentiated. Carcinoma is on the deep and radial margins.   IMAGING - MR Face (4/18/2024): Lytic destructive changes in the frontal bone and sinuses; interval increase in size of enhancing soft tissue mass that measures approximately 1.2 x 1.9 x 2.9 cm in the frontal region as well as in the right greater than left nasal labial fold likely to represent granulation tissue and/or disease progression. Small defect along the left nasal ala and anterior nasal septum. Polpoid mucosal thickening left maxillary and bilateral ethmoid air cells. Reactive pachymeningeal thickening in the left frontal region subjacent to the lytic bone lesions probably reactive. No epidural fluid collection. Thickening and enhancement along the posterior wall of the right globe.  - MR Brain (4/18/2024): No acute infarct, intracranial hemorrhage or abnormal parenchymal enhancement. Stable 5 mm enhancing round lesions in the left aspect of the foramen magnum behind the V4 segment of the vertebral artery.   - PET-CT (716/024): Interval progression of local extension of multiple now confluent hypermetabolic facial masses, consistent with primary neoplasm with associated erosion of the bilateral frontal bones medially. New evidence of FDG-avid pachymeningeal and possible leptomeningeal involvement. Local invasion of the right medial orbital wall with possible involvement of the ethmoid sinus and orbital septum and preseptal space penetration suspected. Interval progression of FDG-avid cervical lymphadenopathy. Slight interval increase in FDG uptake of bilateral axillary LNs.  Due to the development of colitis, the immunotherapy was discontinued.  He was started on a steroid taper. He then initiated therapy on capecitabine due to progression of disease.  After 1 week of therapy, he states he noted an improvement in the appearance of his disease.  Given a phase I clinical trial opportunity, he discontinued the capecitabine while he considered trial participation; however, due to the trial logistics and travel complexity, he has opted not to pursue this study.  He was started on metformin for the steroid induced hyperglycemia.  He was more recently restarted on capecitabine at a reduced dose, with good tolerance and a clinical response to therapy per the patient's report.  12/10/2024 Since his last visit, he was admitted in [facility] for bleeding in his small intestine but was ultimately transferred to [place]. He states that his Hgb remains around 7 g/dL and they will be doing an additional study to determine where the bleed is occurring. Patient reports that he would like to be reconsidered for the trial once his bleeding has been controlled.   12/17/2024 The [place] team reported that his tumor is now impacting his left eye, patient is now scheduled to see Dr. Deni Garcia this week to address this issue.  I also reviewed a medical note from [facility] from earlier this month. Regarding his facial lesion, patient has been initiated on doxycycline therapy. He was referred to wound care, where it was advised that he perform twice-daily saline rinses and use xeroform dressing, covering the wound with Telfa, with an optional application of clindamycin ointment to promote healing and prevent infection.  Furthermore, the patient experienced a significant gastrointestinal bleed, which led to a push enterostomy performed at [facility] on December 4, 2024. 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 During his hospital stay starting November 21, 2024, he required 12 units of packed red blood cells (pRBCs). His renal function showed notable changes, with creatinine levels rising from a baseline of 1.0 to 2.1 on the date of the visit.  Patient spoke to his [place] team prior to this appointment. The plan is for patient to have a port inserted on Thursday and then start 5FU/Cisplatin next week, we also discussed using Imiquimod Cream to the lesion near his eye.

## 2024-12-18 NOTE — HISTORY OF PRESENT ILLNESS
[de-identified] : 12/17/2024 The [facility] team reported that his tumor is now impacting his left eye, patient is now scheduled to see Dr. Deni Garcia this week to address this issue.  I also reviewed a medical note from [facility] ([facility]) from earlier this month. Regarding his facial lesion, patient has been initiated on doxycycline therapy. He was referred to wound care, where it was advised that he perform twice-daily saline rinses and use xeroform dressing, covering the wound with Telfa, with an optional application of clindamycin ointment to promote healing and prevent infection.  Furthermore, the patient experienced a significant gastrointestinal bleed, which led to a push enterostomy performed at [facility] on December 4, 2024. 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 During his hospital stay starting November 21, 2024, he required 12 units of packed red blood cells (pRBCs). His renal function showed notable changes, with creatinine levels rising from a baseline of 1.0 to 2.1 on the date of the visit.  12/10/2024 Since his last visit, he was admitted in [facility] for bleeding in his small intestine but was ultimately transferred to [facility]. He states that his Hgb remains around 7 g/dL and they will be doing an additional study to determine where the bleed is occurring. Patient reports that he would like to be reconsidered for the trial once his bleeding has been controlled.  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  MOLECULAR CHARACTERISTICS: Foundation one 5/2024: TMB 5mts/mb. BARD1 V767f *4 (46%). HRAS Q61R (29.1%). PTCH1 splice variant. ASXL1 Q695*. ACVR1B R420*. CASP8 R452*, TERT promoter mutation. TGFBR2 S441F. VUS in BRCA2, BAP1, MYCN, ROS1, CIC, TGFBR2, KMT2D.  CURRENT THERAPY: Capecitabine (currently being held)  PRIOR THERAPIES: Vismodegib 150 mg daily 10/2021 - 2/2023 Vismodegib 150 mg daily, 2 weeks on 2 weeks off: 2/2023 - 5/2023 Cemiplimab 350 mg IV q3 weeks x 6 cycles: 5/16/23 - 8/29/23 Carboplatin AUC 4/paclitaxel 135 mg/m2 x 3 cycles: 9/19/23 - 10/31/23 Cetuximab 500 mg/m2 q2 weeks + nivolumab 240mg started at C2:11/28/23 - 06/2024 Vismodegib + nivolumab 06/2024-7/2024 Ipilimumab + nivolumab 07/11/2024 - present; C1 07/11/24 C2 08/01/24 Nivolumab 480 mg IV and vismodegib 150 mg daily: 06/2024 -   INTERVAL HISTORY: Mr. Cook is a 67-year-old male with BCC and SCC of the face who presents for continued management of his locally advanced disease.  He is followed primarily at [facility] and sees Dr. Cata Bee, Dr. Abhinav Baldwin and Dr. Deni Garcia.  Please see my prior notes for his complex history.  Briefly, the patient reported that around 30 years ago he had BCC of the face resected by wide excision on scalp/forehead. In 2008, he noticed that the lesion had been growing but was experiencing financial difficulties caused by the recession and was unable to seek medical attention. In 2017, it begun to affect his vision and he attempted to use topical treatments. Eventually the erosive face lesion was biopsied 8/18/21 demonstrating BCC, superficial, nodular, and infiltrative types, + ulceration, extending to peripheral and deep margins. Pathologic review at [facility] also noted focal metatypical features and neighboring proliferating actinic keratosis.  He initiated therapy with vismodegib 150 mg daily on 10/29/2021and tolerated relatively well, with change in taste and intermittent muscle cramps ultimately requiring dose reduction. He achieved a marked clinical response to therapy prior to progression noted clinically as enlarging lesion on R cheek in May 2023.  5/16/2023: He began cemiplimab 350mg q3 weeks. When he presented for C2, he had significant pus drainage from opening above his R eye and throughout the wound. He was prescribed antibiotics by Dr. Baldwin, initially improved but required a longer course and ultimately prompted CT maxillofacial 8/11/23. The CT showed progression in his disease with a new large right frontal soft tissue mass, with erosive osseous changes and invasion of the R frontal sinus. Extensive additional regions of pre-existing ulcerate neoplasm are stable to minimally improved. He received a total of 6 cycles cemiplimab.  8/29/23: Dr. Baldwin biopsied multiple growing lesions including nasal septum, right nose, and right cheek. Pathology of all three regions showed SCC, well-differentiated, by cytokeratin staining.  09/15/23: He underwent baseline PET CT which demonstrated three separate hypermetabolic lesions involving the skin of the face including left frontal, midline nasal and right frontotemporal areas representing active malignancy, and bilateral hypermetabolic cervical lymph nodes. There was no evidence for distant hypermetabolic disease.  9/19/2023: He initiated dose reduced carbo/taxol and completed 2 cycles, tolerated well.   11/14/2023: Restaging noncontrast CT showed mixed response with progression in anterior nasal cavity and ethmoid sinuses with bone loss, multifocal lobulated thickening along the nose is worse compared to prior. Area of right superolateral orbital thickening extending towards the dura and along the right superior eyelid is also worse. Bilateral proptosis is stable. Chronic rhinosinusitis. Post-treatment changes in midline face and right orbit. There is no cervical lymphadenopathy or evidence of distant metastases. Left front region is not imaged on this scan.   11/28/23: He began cetuximab 500 mg/m2 and During C1 infusion, he had reaction with flushing / full body itching within about 5 min of infusion start. He was given famotidine 20 mg and methylpred 125 mg (in addition to the diphenhydramine 50 mg pre med) and was subsequently able to tolerate infusion at slower rate without issue.  1/11/24:  MR Face & Neck with and without contrast demonstrated "favorable treatment response with improved but persistent disease involving the right orbit, right aspect of the face with some minimal intracranial extension but without intra-axial extension. Small amount of nodular thickening is also noted in the left supraorbital scalp. A very small loculated fluid collection within right supraorbital portion of the tumor, most likely area of necrosis; no acute infarction, acute intracranial hemorrhage, or mass effect within the brain; stable 5 mm enhancing extra-axial lesion within the left aspect of the foramen magnum, likely incidental meningioma."  5/30/2024: Patient presented at [place] for cetuximab + nivo. He reported that the open area on the right side of his forehead was experiencing intermittent bloody discharge. Primary SE from treatment were fatigue, joint aches (hands/knees), acneiform rash on his neck/cheeeks (persistent but mild) - using topical clindamycin.   Due to progressive disease, his therapy was changed to vismodegib together with nivolumab which he has been tolerating well.  Patient is here today for a follow up visit, last seen mid - June.  Patient is s/p imaging at [place].   PATHOLOGY - Right Cheek Biopsy (4/17/2024): Squamous cell carcinoma, keratinizing - type, well - differentiated. Carcinoma is on the deep and radial margins.   IMAGING - MR Face (4/18/2024): Lytic destructive changes in the frontal bone and sinuses; interval increase in size of enhancing soft tissue mass that measures approximately 1.2 x 1.9 x 2.9 cm in the frontal region as well as in the right greater than left nasal labial fold likely to represent granulation tissue and/or disease progression. Small defect along the left nasal ala and anterior nasal septum. Polpoid mucosal thickening left maxillary and bilateral ethmoid air cells. Reactive pachymeningeal thickening in the left frontal region subjacent to the lytic bone lesions probably reactive. No epidural fluid collection. Thickening and enhancement along the posterior wall of the right globe.  - MR Brain (4/18/2024): No acute infarct, intracranial hemorrhage or abnormal parenchymal enhancement. Stable 5 mm enhancing round lesions in the left aspect of the foramen magnum behind the V4 segment of the vertebral artery.   - PET-CT (716/024): Interval progression of local extension of multiple now confluent hypermetabolic facial masses, consistent with primary neoplasm with associated erosion of the bilateral frontal bones medially. New evidence of FDG-avid pachymeningeal and possible leptomeningeal involvement. Local invasion of the right medial orbital wall with possible involvement of the ethmoid sinus and orbital septum and preseptal space penetration suspected. Interval progression of FDG-avid cervical lymphadenopathy. Slight interval increase in FDG uptake of bilateral axillary LNs.  Due to the development of colitis, the immunotherapy was discontinued.  He was started on a steroid taper. He then initiated therapy on capecitabine due to progression of disease.  After 1 week of therapy, he states he noted an improvement in the appearance of his disease.  Given a phase I clinical trial opportunity, he discontinued the capecitabine while he considered trial participation; however, due to the trial logistics and travel complexity, he has opted not to pursue this study.  He was started on metformin for the steroid induced hyperglycemia.  He was more recently restarted on capecitabine at a reduced dose, with good tolerance and a clinical response to therapy per the patient's report.  12/10/2024 Since his last visit, he was admitted in [facility] for bleeding in his small intestine but was ultimately transferred to [place]. He states that his Hgb remains around 7 g/dL and they will be doing an additional study to determine where the bleed is occurring. Patient reports that he would like to be reconsidered for the trial once his bleeding has been controlled.   12/17/2024 The [place] team reported that his tumor is now impacting his left eye, patient is now scheduled to see Dr. Deni Garcia this week to address this issue.  I also reviewed a medical note from [facility] ([facility]) from earlier this month. Regarding his facial lesion, patient has been initiated on doxycycline therapy. He was referred to wound care, where it was advised that he perform twice-daily saline rinses and use xeroform dressing, covering the wound with Telfa, with an optional application of clindamycin ointment to promote healing and prevent infection.  Furthermore, the patient experienced a significant gastrointestinal bleed, which led to a push enterostomy performed at [facility] on December 4, 2024. 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 During his hospital stay starting November 21, 2024, he required 12 units of packed red blood cells (pRBCs). His renal function showed notable changes, with creatinine levels rising from a baseline of 1.0 to 2.1 on the date of the visit.  Patient spoke to his [place] team prior to this appointment. The plan is for patient to have a port inserted on Thursday and then start 5FU/Cisplatin next week, we also discussed using Imiquimod Cream to the lesion near his eye.

## 2024-12-18 NOTE — PHYSICAL EXAM
[Normal] : affect appropriate [Fully active, able to carry on all pre-disease performance without restriction] : Status 0 - Fully active, able to carry on all pre-disease performance without restriction [de-identified] : Wound on right side of his face, which encroached on his right eye and right side of his face.  [de-identified] : No increased work of breathing.

## 2024-12-18 NOTE — ASSESSMENT
[FreeTextEntry1] : Mr. Cook is a 67-year-old male who is here today for a discussion of further management options for his disease  He has a history of BCC resected remotely, followed by recurrence in August 2021 of BCC with metatypical features involving the forehead, nose and cheek. He initially achieved good control on vismodegib until May 2023 when he developed disease progression with right eye vision loss and progressing disease to the contralateral face. He then was initiated on cemiplimab x 6 cycles and was found to have a new 4 cm frontal soft tissue mass which was invading his frontal sinus bone and orbit. Biopsies of these lesions were consistent with SCC. Patient then was transitioned to carbo/taxol and then was transitioned to cetuximab + nivo with an initial good clinical response.  In the setting of progression, his therapy was changed to vismodegib and nivoluamb and, most recently, capecitabine. 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   While on capecitabine, patient thought that he was achieving a clinical response. However, it was held when patient experienced a GI bleed and required 12 units of PRBCs, endoclips and APC.   He will have a port placed on Thursday and will begin 5fu/cisplatin next week at [facility]. In addition, patient should use imiquimod cream to his wound and should also be set up with palliative care at [facility].   We will see him back in clinic within 2 months (~February 2025).

## 2024-12-18 NOTE — ASSESSMENT
[FreeTextEntry1] : Mr. Cook is a 67-year-old male who is here today for a discussion of further management options for his disease  He has a history of BCC resected remotely, followed by recurrence in August 2021 of BCC with metatypical features involving the forehead, nose and cheek. He initially achieved good control on vismodegib until May 2023 when he developed disease progression with right eye vision loss and progressing disease to the contralateral face. He then was initiated on cemiplimab x 6 cycles and was found to have a new 4 cm frontal soft tissue mass which was invading his frontal sinus bone and orbit. Biopsies of these lesions were consistent with SCC. Patient then was transitioned to carbo/taxol and then was transitioned to cetuximab + nivo with an initial good clinical response.  In the setting of progression, his therapy was changed to vismodegib and nivoluamb and, most recently, capecitabine. 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   While on capecitabine, patient thought that he was achieving a clinical response. However, it was held when patient experienced a GI bleed and required 12 units of PRBCs, endoclips and APC.   He will have a port placed on Thursday and will begin 5fu/cisplatin next week at [facility]. In addition, patient should use imiquimod cream to his wound and should also be set up with palliative care at [facility].   We will see him back in clinic within 2 months (~February 2025).

## 2025-01-28 NOTE — HISTORY OF PRESENT ILLNESS
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  MOLECULAR CHARACTERISTICS: Foundation one 5/2024: TMB 5mts/mb. BARD1 V767f *4 (46%). HRAS Q61R (29.1%). PTCH1 splice variant. ASXL1 Q695*. ACVR1B R420*. CASP8 R452*, TERT promoter mutation. TGFBR2 S441F. VUS in BRCA2, BAP1, MYCN, ROS1, CIC, TGFBR2, KMT2D.  CURRENT THERAPY: Capecitabine (currently being held)  PRIOR THERAPIES: Vismodegib 150 mg daily 10/2021 - 2/2023 Vismodegib 150 mg daily, 2 weeks on 2 weeks off: 2/2023 - 5/2023 Cemiplimab 350 mg IV q3 weeks x 6 cycles: 5/16/23 - 8/29/23 Carboplatin AUC 4/paclitaxel 135 mg/m2 x 3 cycles: 9/19/23 - 10/31/23 Cetuximab 500 mg/m2 q2 weeks + nivolumab 240mg started at C2:11/28/23 - 06/2024 Vismodegib + nivolumab 06/2024-7/2024 Ipilimumab + nivolumab 07/11/2024 - present; C1 07/11/24 C2 08/01/24 Nivolumab 480 mg IV and vismodegib 150 mg daily: 06/2024 -   INTERVAL HISTORY: Mr. Cook is a 67-year-old male with BCC and SCC of the face who presents for continued management of his locally advanced disease.  He is followed primarily at [facility] and sees Dr. Cata Bee, Dr. Abhinav Baldwin and Dr. Deni Garcia.  Please see my prior notes for his complex history.  Briefly, the patient reported that around 30 years ago he had BCC of the face resected by wide excision on scalp/forehead. In 2008, he noticed that the lesion had been growing but was experiencing financial difficulties caused by the recession and was unable to seek medical attention. In 2017, it begun to affect his vision and he attempted to use topical treatments. Eventually the erosive face lesion was biopsied 8/18/21 demonstrating BCC, superficial, nodular, and infiltrative types, + ulceration, extending to peripheral and deep margins. Pathologic review at [facility] also noted focal metatypical features and neighboring proliferating actinic keratosis.  He initiated therapy with vismodegib 150 mg daily on 10/29/2021and tolerated relatively well, with change in taste and intermittent muscle cramps ultimately requiring dose reduction. He achieved a marked clinical response to therapy prior to progression noted clinically as enlarging lesion on R cheek in May 2023.  5/16/2023: He began cemiplimab 350mg q3 weeks. When he presented for C2, he had significant pus drainage from opening above his R eye and throughout the wound. He was prescribed antibiotics by Dr. Baldwin, initially improved but required a longer course and ultimately prompted CT maxillofacial 8/11/23. The CT showed progression in his disease with a new large right frontal soft tissue mass, with erosive osseous changes and invasion of the R frontal sinus. Extensive additional regions of pre-existing ulcerate neoplasm are stable to minimally improved. He received a total of 6 cycles cemiplimab.  8/29/23: Dr. Baldwin biopsied multiple growing lesions including nasal septum, right nose, and right cheek. Pathology of all three regions showed SCC, well-differentiated, by cytokeratin staining.  09/15/23: He underwent baseline PET CT which demonstrated three separate hypermetabolic lesions involving the skin of the face including left frontal, midline nasal and right frontotemporal areas representing active malignancy, and bilateral hypermetabolic cervical lymph nodes. There was no evidence for distant hypermetabolic disease.  9/19/2023: He initiated dose reduced carbo/taxol and completed 2 cycles, tolerated well.   11/14/2023: Restaging noncontrast CT showed mixed response with progression in anterior nasal cavity and ethmoid sinuses with bone loss, multifocal lobulated thickening along the nose is worse compared to prior. Area of right superolateral orbital thickening extending towards the dura and along the right superior eyelid is also worse. Bilateral proptosis is stable. Chronic rhinosinusitis. Post-treatment changes in midline face and right orbit. There is no cervical lymphadenopathy or evidence of distant metastases. Left front region is not imaged on this scan.   11/28/23: He began cetuximab 500 mg/m2 and During C1 infusion, he had reaction with flushing / full body itching within about 5 min of infusion start. He was given famotidine 20 mg and methylpred 125 mg (in addition to the diphenhydramine 50 mg pre med) and was subsequently able to tolerate infusion at slower rate without issue.  1/11/24:  MR Face & Neck with and without contrast demonstrated "favorable treatment response with improved but persistent disease involving the right orbit, right aspect of the face with some minimal intracranial extension but without intra-axial extension. Small amount of nodular thickening is also noted in the left supraorbital scalp. A very small loculated fluid collection within right supraorbital portion of the tumor, most likely area of necrosis; no acute infarction, acute intracranial hemorrhage, or mass effect within the brain; stable 5 mm enhancing extra-axial lesion within the left aspect of the foramen magnum, likely incidental meningioma."  5/30/2024: Patient presented at [facility] for cetuximab + nivo. He reported that the open area on the right side of his forehead was experiencing intermittent bloody discharge. Primary SE from treatment were fatigue, joint aches (hands/knees), acneiform rash on his neck/cheeeks (persistent but mild) - using topical clindamycin.   Due to progressive disease, his therapy was changed to vismodegib together with nivolumab which he has been tolerating well.  Patient is here today for a follow up visit, last seen mid - June.  Patient is s/p imaging at [facility].   PATHOLOGY - Right Cheek Biopsy (4/17/2024): Squamous cell carcinoma, keratinizing - type, well - differentiated. Carcinoma is on the deep and radial margins.   IMAGING - MR Face (4/18/2024): Lytic destructive changes in the frontal bone and sinuses; interval increase in size of enhancing soft tissue mass that measures approximately 1.2 x 1.9 x 2.9 cm in the frontal region as well as in the right greater than left nasal labial fold likely to represent granulation tissue and/or disease progression. Small defect along the left nasal ala and anterior nasal septum. Polpoid mucosal thickening left maxillary and bilateral ethmoid air cells. Reactive pachymeningeal thickening in the left frontal region subjacent to the lytic bone lesions probably reactive. No epidural fluid collection. Thickening and enhancement along the posterior wall of the right globe.  - MR Brain (4/18/2024): No acute infarct, intracranial hemorrhage or abnormal parenchymal enhancement. Stable 5 mm enhancing round lesions in the left aspect of the foramen magnum behind the V4 segment of the vertebral artery.   - PET-CT (716/024): Interval progression of local extension of multiple now confluent hypermetabolic facial masses, consistent with primary neoplasm with associated erosion of the bilateral frontal bones medially. New evidence of FDG-avid pachymeningeal and possible leptomeningeal involvement. Local invasion of the right medial orbital wall with possible involvement of the ethmoid sinus and orbital septum and preseptal space penetration suspected. Interval progression of FDG-avid cervical lymphadenopathy. Slight interval increase in FDG uptake of bilateral axillary LNs.  Due to the development of colitis, the immunotherapy was discontinued.  He was started on a steroid taper. He then initiated therapy on capecitabine due to progression of disease.  After 1 week of therapy, he states he noted an improvement in the appearance of his disease.  Given a phase I clinical trial opportunity, he discontinued the capecitabine while he considered trial participation; however, due to the trial logistics and travel complexity, he has opted not to pursue this study.  He was started on metformin for the steroid induced hyperglycemia.  He was more recently restarted on capecitabine at a reduced dose, with good tolerance and a clinical response to therapy per the patient's report.  12/10/2024 Since his last visit, he was admitted in [facility] for bleeding in his small intestine but was ultimately transferred to [facility]. He states that his Hgb remains around 7 g/dL and they will be doing an additional study to determine where the bleed is occurring. Patient reports that he would like to be reconsidered for the trial once his bleeding has been controlled.   12/17/2024 The [facility] team reported that his tumor is now impacting his left eye, patient is now scheduled to see Dr. Deni Garcia this week to address this issue.  I also reviewed a medical note from [facility] ([facility]) from earlier this month. Regarding his facial lesion, patient has been initiated on doxycycline therapy. He was referred to wound care, where it was advised that he perform twice-daily saline rinses and use xeroform dressing, covering the wound with Telfa, with an optional application of clindamycin ointment to promote healing and prevent infection.  Furthermore, the patient experienced a significant gastrointestinal bleed, which led to a push enterostomy performed at [facility] on December 4, 2024. 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 During his hospital stay starting November 21, 2024, he required 12 units of packed red blood cells (pRBCs). His renal function showed notable changes, with creatinine levels rising from a baseline of 1.0 to 2.1 on the date of the visit.  Patient spoke to his [facility] team prior to this appointment. The plan is for patient to have a port inserted on Thursday and then start 5FU/Cisplatin next week, we also discussed using Imiquimod Cream to the lesion near his eye.   1/28/2025 The patient has returned for a follow-up visit after undergoing IV chemotherapy at [facility]. After the first cycle of 5FU, the patient noticed a reduction in the lesion size. However, following the second cycle, the patient experienced significant blood loss, estimated at "three pints." It is important to note that a complete blood count (CBC) has not been performed since this bleeding incident. Post-treatment, there is notable numbness in the jaw and lip area, due to invasion in his oral cavity.  We communicated with Fara at [facility], who informed us that the patient did not receive Cisplatin during the second cycle due to time constraints. Cisplatin is scheduled to be included in the third cycle of treatment.

## 2025-01-28 NOTE — REASON FOR VISIT
[Follow-Up Visit] : a follow-up [Family Member] : family member [Home] : at home, [unfilled] , at the time of the visit. [Medical Office: ([facility])___] : at the medical office located in  [Patient] : the patient [Self] : self

## 2025-01-28 NOTE — ASSESSMENT
[FreeTextEntry1] : Mr. Cook is a 67-year-old male who is here today for a follow up after administration of IV Chemo (5fu).  He has a history of BCC resected remotely, followed by recurrence in August 2021 of BCC with metatypical features involving the forehead, nose and cheek. He initially achieved good control on vismodegib until May 2023 when he developed disease progression with right eye vision loss and progressing disease to the contralateral face. He then was initiated on cemiplimab x 6 cycles and was found to have a new 4 cm frontal soft tissue mass which was invading his frontal sinus bone and orbit. Biopsies of these lesions were consistent with SCC. Patient then was transitioned to carbo/taxol and then was transitioned to cetuximab + nivo with an initial good clinical response. In the setting of progression, his therapy was changed to vismodegib and nivoluamb and, most recently, capecitabine. 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 While on capecitabine, patient thought that he was achieving a clinical response. However, it was held when patient experienced a GI bleed and required 12 units of PRBCs, endoclips and APC.   Patient began 5FU for 2 cycles and will have Cisplatin added on to Cycle 3. He reported to our team that he experienced bleeding to his lesion with the initiation of 5FU, stating that he lost "3 pints" of blood. We will reach out to head and neck surgeon at [place] to assess the lesion.

## 2025-01-28 NOTE — REVIEW OF SYSTEMS
[Diarrhea: Grade 0] : Diarrhea: Grade 0 [Negative] : Allergic/Immunologic [FreeTextEntry2] : Please see HPI. [FreeTextEntry7] : Currently experiencing a GI bleed.

## 2025-01-28 NOTE — HISTORY OF PRESENT ILLNESS
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  MOLECULAR CHARACTERISTICS: Foundation one 5/2024: TMB 5mts/mb. BARD1 V767f *4 (46%). HRAS Q61R (29.1%). PTCH1 splice variant. ASXL1 Q695*. ACVR1B R420*. CASP8 R452*, TERT promoter mutation. TGFBR2 S441F. VUS in BRCA2, BAP1, MYCN, ROS1, CIC, TGFBR2, KMT2D.  CURRENT THERAPY: Capecitabine (currently being held)  PRIOR THERAPIES: Vismodegib 150 mg daily 10/2021 - 2/2023 Vismodegib 150 mg daily, 2 weeks on 2 weeks off: 2/2023 - 5/2023 Cemiplimab 350 mg IV q3 weeks x 6 cycles: 5/16/23 - 8/29/23 Carboplatin AUC 4/paclitaxel 135 mg/m2 x 3 cycles: 9/19/23 - 10/31/23 Cetuximab 500 mg/m2 q2 weeks + nivolumab 240mg started at C2:11/28/23 - 06/2024 Vismodegib + nivolumab 06/2024-7/2024 Ipilimumab + nivolumab 07/11/2024 - present; C1 07/11/24 C2 08/01/24 Nivolumab 480 mg IV and vismodegib 150 mg daily: 06/2024 -   INTERVAL HISTORY: Mr. Cook is a 67-year-old male with BCC and SCC of the face who presents for continued management of his locally advanced disease.  He is followed primarily at  and sees Dr. Cata Bee, Dr. Abhinav Baldwin and Dr. Deni Garcia.  Please see my prior notes for his complex history.  Briefly, the patient reported that around 30 years ago he had BCC of the face resected by wide excision on scalp/forehead. In 2008, he noticed that the lesion had been growing but was experiencing financial difficulties caused by the recession and was unable to seek medical attention. In 2017, it begun to affect his vision and he attempted to use topical treatments. Eventually the erosive face lesion was biopsied 8/18/21 demonstrating BCC, superficial, nodular, and infiltrative types, + ulceration, extending to peripheral and deep margins. Pathologic review at [facility] also noted focal metatypical features and neighboring proliferating actinic keratosis.  He initiated therapy with vismodegib 150 mg daily on 10/29/2021and tolerated relatively well, with change in taste and intermittent muscle cramps ultimately requiring dose reduction. He achieved a marked clinical response to therapy prior to progression noted clinically as enlarging lesion on R cheek in May 2023.  5/16/2023: He began cemiplimab 350mg q3 weeks. When he presented for C2, he had significant pus drainage from opening above his R eye and throughout the wound. He was prescribed antibiotics by Dr. Baldwin, initially improved but required a longer course and ultimately prompted CT maxillofacial 8/11/23. The CT showed progression in his disease with a new large right frontal soft tissue mass, with erosive osseous changes and invasion of the R frontal sinus. Extensive additional regions of pre-existing ulcerate neoplasm are stable to minimally improved. He received a total of 6 cycles cemiplimab.  8/29/23: Dr. Baldwin biopsied multiple growing lesions including nasal septum, right nose, and right cheek. Pathology of all three regions showed SCC, well-differentiated, by cytokeratin staining.  09/15/23: He underwent baseline PET CT which demonstrated three separate hypermetabolic lesions involving the skin of the face including left frontal, midline nasal and right frontotemporal areas representing active malignancy, and bilateral hypermetabolic cervical lymph nodes. There was no evidence for distant hypermetabolic disease.  9/19/2023: He initiated dose reduced carbo/taxol and completed 2 cycles, tolerated well.   11/14/2023: Restaging noncontrast CT showed mixed response with progression in anterior nasal cavity and ethmoid sinuses with bone loss, multifocal lobulated thickening along the nose is worse compared to prior. Area of right superolateral orbital thickening extending towards the dura and along the right superior eyelid is also worse. Bilateral proptosis is stable. Chronic rhinosinusitis. Post-treatment changes in midline face and right orbit. There is no cervical lymphadenopathy or evidence of distant metastases. Left front region is not imaged on this scan.   11/28/23: He began cetuximab 500 mg/m2 and During C1 infusion, he had reaction with flushing / full body itching within about 5 min of infusion start. He was given famotidine 20 mg and methylpred 125 mg (in addition to the diphenhydramine 50 mg pre med) and was subsequently able to tolerate infusion at slower rate without issue.  1/11/24:  MR Face & Neck with and without contrast demonstrated "favorable treatment response with improved but persistent disease involving the right orbit, right aspect of the face with some minimal intracranial extension but without intra-axial extension. Small amount of nodular thickening is also noted in the left supraorbital scalp. A very small loculated fluid collection within right supraorbital portion of the tumor, most likely area of necrosis; no acute infarction, acute intracranial hemorrhage, or mass effect within the brain; stable 5 mm enhancing extra-axial lesion within the left aspect of the foramen magnum, likely incidental meningioma."  5/30/2024: Patient presented at [facility] for cetuximab + nivo. He reported that the open area on the right side of his forehead was experiencing intermittent bloody discharge. Primary SE from treatment were fatigue, joint aches (hands/knees), acneiform rash on his neck/cheeeks (persistent but mild) - using topical clindamycin.   Due to progressive disease, his therapy was changed to vismodegib together with nivolumab which he has been tolerating well.  Patient is here today for a follow up visit, last seen mid - June.  Patient is s/p imaging at [facility].   PATHOLOGY - Right Cheek Biopsy (4/17/2024): Squamous cell carcinoma, keratinizing - type, well - differentiated. Carcinoma is on the deep and radial margins.   IMAGING - MR Face (4/18/2024): Lytic destructive changes in the frontal bone and sinuses; interval increase in size of enhancing soft tissue mass that measures approximately 1.2 x 1.9 x 2.9 cm in the frontal region as well as in the right greater than left nasal labial fold likely to represent granulation tissue and/or disease progression. Small defect along the left nasal ala and anterior nasal septum. Polpoid mucosal thickening left maxillary and bilateral ethmoid air cells. Reactive pachymeningeal thickening in the left frontal region subjacent to the lytic bone lesions probably reactive. No epidural fluid collection. Thickening and enhancement along the posterior wall of the right globe.  - MR Brain (4/18/2024): No acute infarct, intracranial hemorrhage or abnormal parenchymal enhancement. Stable 5 mm enhancing round lesions in the left aspect of the foramen magnum behind the V4 segment of the vertebral artery.   - PET-CT (716/024): Interval progression of local extension of multiple now confluent hypermetabolic facial masses, consistent with primary neoplasm with associated erosion of the bilateral frontal bones medially. New evidence of FDG-avid pachymeningeal and possible leptomeningeal involvement. Local invasion of the right medial orbital wall with possible involvement of the ethmoid sinus and orbital septum and preseptal space penetration suspected. Interval progression of FDG-avid cervical lymphadenopathy. Slight interval increase in FDG uptake of bilateral axillary LNs.  Due to the development of colitis, the immunotherapy was discontinued.  He was started on a steroid taper. He then initiated therapy on capecitabine due to progression of disease.  After 1 week of therapy, he states he noted an improvement in the appearance of his disease.  Given a phase I clinical trial opportunity, he discontinued the capecitabine while he considered trial participation; however, due to the trial logistics and travel complexity, he has opted not to pursue this study.  He was started on metformin for the steroid induced hyperglycemia.  He was more recently restarted on capecitabine at a reduced dose, with good tolerance and a clinical response to therapy per the patient's report.  12/10/2024 Since his last visit, he was admitted in [facility] for bleeding in his small intestine but was ultimately transferred to [facility]. He states that his Hgb remains around 7 g/dL and they will be doing an additional study to determine where the bleed is occurring. Patient reports that he would like to be reconsidered for the trial once his bleeding has been controlled.   12/17/2024 The [facility] team reported that his tumor is now impacting his left eye, patient is now scheduled to see Dr. Deni Garcia this week to address this issue.  I also reviewed a medical note from [facility] from earlier this month. Regarding his facial lesion, patient has been initiated on doxycycline therapy. He was referred to wound care, where it was advised that he perform twice-daily saline rinses and use xeroform dressing, covering the wound with Telfa, with an optional application of clindamycin ointment to promote healing and prevent infection.  Furthermore, the patient experienced a significant gastrointestinal bleed, which led to a push enterostomy performed at [facility] on December 4, 2024. 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 During his hospital stay starting November 21, 2024, he required 12 units of packed red blood cells (pRBCs). His renal function showed notable changes, with creatinine levels rising from a baseline of 1.0 to 2.1 on the date of the visit.  Patient spoke to his [facility] team prior to this appointment. The plan is for patient to have a port inserted on Thursday and then start 5FU/Cisplatin next week, we also discussed using Imiquimod Cream to the lesion near his eye.   1/28/2025 The patient has returned for a follow-up visit after undergoing IV chemotherapy at [facility]. After the first cycle of 5FU, the patient noticed a reduction in the lesion size. However, following the second cycle, the patient experienced significant blood loss, estimated at "three pints." It is important to note that a complete blood count (CBC) has not been performed since this bleeding incident. Post-treatment, there is notable numbness in the jaw and lip area, due to invasion in his oral cavity.  We communicated with [facility] at [facility], who informed us that the patient did not receive Cisplatin during the second cycle due to time constraints. Cisplatin is scheduled to be included in the third cycle of treatment.

## 2025-01-28 NOTE — PHYSICAL EXAM
[Fully active, able to carry on all pre-disease performance without restriction] : Status 0 - Fully active, able to carry on all pre-disease performance without restriction [Normal] : affect appropriate [de-identified] : Wound on right side of his face, which encroached on his right eye and right side of his face.  [de-identified] : No increased work of breathing.

## 2025-01-28 NOTE — REASON FOR VISIT
[Follow-Up Visit] : a follow-up [Family Member] : family member [Home] : at home, [unfilled] , at the time of the visit. [Medical Office: ([facility])___] : at the medical office located in  [Patient] : the patient [Self] : self

## 2025-01-28 NOTE — PHYSICAL EXAM
[Fully active, able to carry on all pre-disease performance without restriction] : Status 0 - Fully active, able to carry on all pre-disease performance without restriction [Normal] : affect appropriate [de-identified] : Wound on right side of his face, which encroached on his right eye and right side of his face.  [de-identified] : No increased work of breathing.

## 2025-01-28 NOTE — ASSESSMENT
[FreeTextEntry1] : Mr. Cook is a 67-year-old male who is here today for a follow up after administration of IV Chemo (5fu).  He has a history of BCC resected remotely, followed by recurrence in August 2021 of BCC with metatypical features involving the forehead, nose and cheek. He initially achieved good control on vismodegib until May 2023 when he developed disease progression with right eye vision loss and progressing disease to the contralateral face. He then was initiated on cemiplimab x 6 cycles and was found to have a new 4 cm frontal soft tissue mass which was invading his frontal sinus bone and orbit. Biopsies of these lesions were consistent with SCC. Patient then was transitioned to carbo/taxol and then was transitioned to cetuximab + nivo with an initial good clinical response. In the setting of progression, his therapy was changed to vismodegib and nivoluamb and, most recently, capecitabine. 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 While on capecitabine, patient thought that he was achieving a clinical response. However, it was held when patient experienced a GI bleed and required 12 units of PRBCs, endoclips and APC.   Patient began 5FU for 2 cycles and will have Cisplatin added on to Cycle 3. He reported to our team that he experienced bleeding to his lesion with the initiation of 5FU, stating that he lost "3 pints" of blood. We will reach out to head and neck surgeon at [facility] to assess the lesion.

## 2025-03-25 NOTE — PHYSICAL EXAM
[Ambulatory and capable of all self care but unable to carry out any work activities] : Status 2- Ambulatory and capable of all self care but unable to carry out any work activities. Up and about more than 50% of waking hours [Normal] : well developed, well nourished, in no acute distress [de-identified] : Gross tumor involvement of the right eye

## 2025-03-25 NOTE — ASSESSMENT
[FreeTextEntry1] : Mr. Cook is a 67 year old male who is here today for a discussion of further management options for his disease  He has a history of BCC reseceted remotely, followed by recurrence in August 2021 of BCC with metatypical features involving the forehead, nose and cheek. More recent biopsies have been consistent with squamous cell carcinoma. He has experienced disease progression or intolerance to vismodegib, cemiplimab, carboplatin and paclitaxel, cetuximab and nivolumab, vismodegib and nivolumab, ipilimumab and nivolumab (complicated by colitis), and capecitabine (complicated by GI bleed). He is currently being treated with 5FU and cisplatin.  At this time, the patient has experienced significant toxicities to his current therapy with overall continued disease progression based upon examination since January 2025.  Options discussed include best supportive care; however, the patient at this time is not open to supportive measures only.  Given his tumor HRAS Q61R mutation, a trial of a targeted therapy approach with MEK inhibition alone or in combination with PI3K pathway inhibition is an attractive option, as would a trial of an agent such as tipifarnib; however, drug access may be a limitation.  The patient is now open to traveling for clinical trials, and I will ensure that he is placed back on our phase I waitlist and is considered for trials at [place] and elsewhere.  Should chemotherapy be continued, I would suggest dose reduction of the cisplatin moving forward.   After a long discussion, all of his questions and concerns were answered to his satisfaction. At this time, I will discuss his case with the team at [place].  At his request, I will see him back virtually in 2 weeks time.

## 2025-03-25 NOTE — HISTORY OF PRESENT ILLNESS
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  MOLECULAR CHARACTERISTICS: Foundation one 5/2024: TMB 5mts/mb. BARD1 V767f *4 (46%). HRAS Q61R (29.1%). PTCH1 splice variant. ASXL1 Q695*. ACVR1B R420*. CASP8 R452*, TERT promoter mutation. TGFBR2 S441F. VUS in BRCA2, BAP1, MYCN, ROS1, CIC, TGFBR2, KMT2D.  CURRENT THERAPY: Cisplatin 100 mg/m2 and 5-FU (cycle 2 administered on 03/06/2025)  PRIOR THERAPIES: Vismodegib 150 mg daily 10/2021 - 2/2023 Vismodegib 150 mg daily, 2 weeks on 2 weeks off: 2/2023 - 5/2023 Cemiplimab 350 mg IV q3 weeks x 6 cycles: 5/16/23 - 8/29/23 Carboplatin AUC 4/paclitaxel 135 mg/m2 x 3 cycles: 9/19/23 - 10/31/23 Cetuximab 500 mg/m2 q2 weeks + nivolumab 240mg started at C2:11/28/23 - 05/2024 Vismodegib + nivolumab 06/2024-7/2024 Ipilimumab + nivolumab: 07/11/2024 - 09/2024, complicated by colitis requiring vedolizumab and infliximab Capecitabine: 10/2024-11/2024 5FU: 12/2024, Cisplatin added 02/2024  INTERVAL HISTORY: Mr. Cook is a 67-year-old male with BCC and SCC of the face who presents for continued management of his locally advanced disease.  He is followed primarily at [facility] and sees Dr. Cata Bee, Dr. Abhinav Baldwin and Dr. Deni Garcia.  Please see my prior notes for his complex history.  He has experienced disease progression or intolerance to vismodegib, cemiplimab, carboplatin and paclitaxel, cetuximab and nivolumab, vismodegib and nivolumab, ipilimumab and nivolumab (complicated by colitis), and capecitabine (complicated by GI bleed).  He is currently being treated with 5FU and cisplatin.  Since the patient was last seen here on 01/28/2025, he initiated therapy with cisplatin and 5FU, with his course complicated by fatigue and cytopenias requiring admission from 03/20/2025 to 03/22/2025. Per report, he has noted some degree of disease control with the addition of cisplatin; however, he continues to experience tumor bleeding and pain.  He is scheduled to see Dr. Bee on Thursday for a discussion of management options.

## 2025-03-25 NOTE — REASON FOR VISIT
[Home] : at home, [unfilled] , at the time of the visit. [Medical Office: ([facility])___] : at the medical office located in  [Telehealth (audio & video)] : This visit was provided via telehealth using real-time 2-way audio visual technology. [Verbal consent obtained from patient] : the patient, [unfilled] [Follow-Up Visit] : a follow-up

## 2025-03-25 NOTE — REVIEW OF SYSTEMS
[Fatigue] : fatigue [Vision Problems] : vision problems [Mucosal Pain] : mucosal pain [Abdominal Pain] : no abdominal pain [Vomiting] : no vomiting [Constipation] : no constipation [Diarrhea: Grade 0] : Diarrhea: Grade 0 [Negative] : Allergic/Immunologic

## 2025-04-07 NOTE — ASSESSMENT
[FreeTextEntry1] : Mr. Cook is a 67 year old male who is here today for a discussion of further management options for his disease  He has a history of BCC reseceted remotely, followed by recurrence in August 2021 of BCC with metatypical features involving the forehead, nose and cheek. More recent biopsies have been consistent with squamous cell carcinoma. He has experienced disease progression or intolerance to vismodegib, cemiplimab, carboplatin and paclitaxel, cetuximab and nivolumab, vismodegib and nivolumab, ipilimumab and nivolumab (complicated by colitis), and capecitabine (complicated by GI bleed). He is currently being treated with 5FU and cisplatin.  At this time, the patient has experienced significant toxicities to his current therapy with overall continued disease progression based upon examination since January 2025.  Options discussed include best supportive care; however, the patient at this time is not open to supportive measures only.  Given his tumor HRAS Q61R mutation, a trial of a targeted therapy approach with MEK inhibition alone or in combination with PI3K pathway inhibition is an attractive option, as would a trial of an agent such as tipifarnib; however, drug access may be a limitation.  The patient is now open to traveling for clinical trials, and I will ensure that he is placed back on our phase I waitlist and is considered for trials at [place] and elsewhere.  Should chemotherapy be continued, I would suggest dose reduction of the cisplatin moving forward.   The plan at this time is ???

## 2025-04-07 NOTE — PHYSICAL EXAM
[Ambulatory and capable of all self care but unable to carry out any work activities] : Status 2- Ambulatory and capable of all self care but unable to carry out any work activities. Up and about more than 50% of waking hours [Normal] : well developed, well nourished, in no acute distress [de-identified] : Gross tumor involvement of the right eye

## 2025-04-07 NOTE — HISTORY OF PRESENT ILLNESS
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  MOLECULAR CHARACTERISTICS: Foundation one 5/2024: TMB 5mts/mb. BARD1 V767f *4 (46%). HRAS Q61R (29.1%). PTCH1 splice variant. ASXL1 Q695*. ACVR1B R420*. CASP8 R452*, TERT promoter mutation. TGFBR2 S441F. VUS in BRCA2, BAP1, MYCN, ROS1, CIC, TGFBR2, KMT2D.  CURRENT THERAPY: Cisplatin 100 mg/m2 and 5-FU (cycle 2 administered on 03/06/2025)  PRIOR THERAPIES: Vismodegib 150 mg daily 10/2021 - 2/2023 Vismodegib 150 mg daily, 2 weeks on 2 weeks off: 2/2023 - 5/2023 Cemiplimab 350 mg IV q3 weeks x 6 cycles: 5/16/23 - 8/29/23 Carboplatin AUC 4/paclitaxel 135 mg/m2 x 3 cycles: 9/19/23 - 10/31/23 Cetuximab 500 mg/m2 q2 weeks + nivolumab 240mg started at C2:11/28/23 - 05/2024 Vismodegib + nivolumab 06/2024-7/2024 Ipilimumab + nivolumab: 07/11/2024 - 09/2024, complicated by colitis requiring vedolizumab and infliximab Capecitabine: 10/2024-11/2024 5FU: 12/2024, Cisplatin added 02/2024  INTERVAL HISTORY: Mr. Cook is a 67-year-old male with BCC and SCC of the face who presents for continued management of his locally advanced disease.  He is followed primarily at [facility] and sees Dr. Cata Bee, Dr. Abhinav Baldwin and Dr. Deni Garcia.  Please see my prior notes for his complex history.  He has experienced disease progression or intolerance to vismodegib, cemiplimab, carboplatin and paclitaxel, cetuximab and nivolumab, vismodegib and nivolumab, ipilimumab and nivolumab (complicated by colitis), and capecitabine (complicated by GI bleed).  He is currently being treated with 5FU and cisplatin.  Since the patient was last seen here on 01/28/2025, he initiated therapy with cisplatin and 5FU, with his course complicated by fatigue and cytopenias requiring admission from 03/20/2025 to 03/22/2025. Per report, he has noted some degree of disease control with the addition of cisplatin; however, he continues to experience tumor bleeding and pain.  He is scheduled to see Dr. Bee on Thursday for a discussion of management options. [de-identified] : 4/8/2025 Patient is here for a follow up televisit. On 3/25/2025 (last clinic visit), he expressed a strong preference for active treatment and declined supportive care, patient was added to our phase 1 trial waitlist, and we discussed with [place] the following potential interval treatments - continuing chemo at reduced dose, reattempting a MEK Inhibitor, possible surgery if feasible.

## 2025-04-07 NOTE — REASON FOR VISIT
[Follow-Up Visit] : a follow-up [Home] : at home, [unfilled] , at the time of the visit. [Medical Office: ([facility])___] : at the medical office located in  [Telehealth (audio & video)] : This visit was provided via telehealth using real-time 2-way audio visual technology. [Verbal consent obtained from patient] : the patient, [unfilled]